# Patient Record
Sex: FEMALE | Race: WHITE | Employment: FULL TIME | URBAN - METROPOLITAN AREA
[De-identification: names, ages, dates, MRNs, and addresses within clinical notes are randomized per-mention and may not be internally consistent; named-entity substitution may affect disease eponyms.]

---

## 2017-02-23 ENCOUNTER — HOSPITAL ENCOUNTER (EMERGENCY)
Facility: HOSPITAL | Age: 27
Discharge: HOME | End: 2017-02-23
Attending: EMERGENCY MEDICINE
Payer: COMMERCIAL

## 2017-02-23 ENCOUNTER — APPOINTMENT (OUTPATIENT)
Dept: CT IMAGING | Facility: HOSPITAL | Age: 27
End: 2017-02-23
Attending: EMERGENCY MEDICINE
Payer: COMMERCIAL

## 2017-02-23 VITALS
BODY MASS INDEX: 23.9 KG/M2 | RESPIRATION RATE: 20 BRPM | TEMPERATURE: 98 F | DIASTOLIC BLOOD PRESSURE: 95 MMHG | HEART RATE: 111 BPM | WEIGHT: 140 LBS | OXYGEN SATURATION: 98 % | SYSTOLIC BLOOD PRESSURE: 130 MMHG | HEIGHT: 64 IN

## 2017-02-23 DIAGNOSIS — R10.9 RIGHT FLANK PAIN: Primary | ICD-10-CM

## 2017-02-23 LAB
ANION GAP SERPL CALC-SCNC: 8 MMOL/L (ref 0–18)
B-HCG UR QL: NEGATIVE
BASOPHILS # BLD: 0.1 K/UL (ref 0–0.2)
BASOPHILS NFR BLD: 1 %
BILIRUB UR QL: NEGATIVE
BUN SERPL-MCNC: 6 MG/DL (ref 8–20)
BUN/CREAT SERPL: 8.3 (ref 10–20)
CALCIUM SERPL-MCNC: 9.6 MG/DL (ref 8.5–10.5)
CHLORIDE SERPL-SCNC: 104 MMOL/L (ref 95–110)
CLARITY UR: CLEAR
CO2 SERPL-SCNC: 28 MMOL/L (ref 22–32)
COLOR UR: COLORLESS
CREAT SERPL-MCNC: 0.72 MG/DL (ref 0.5–1.5)
EOSINOPHIL # BLD: 0.3 K/UL (ref 0–0.7)
EOSINOPHIL NFR BLD: 3 %
ERYTHROCYTE [DISTWIDTH] IN BLOOD BY AUTOMATED COUNT: 14 % (ref 11–15)
GLUCOSE SERPL-MCNC: 91 MG/DL (ref 70–99)
GLUCOSE UR-MCNC: NEGATIVE MG/DL
HCT VFR BLD AUTO: 43.2 % (ref 35–48)
HGB BLD-MCNC: 14.4 G/DL (ref 12–16)
HGB UR QL STRIP.AUTO: NEGATIVE
KETONES UR-MCNC: NEGATIVE MG/DL
LEUKOCYTE ESTERASE UR QL STRIP.AUTO: NEGATIVE
LYMPHOCYTES # BLD: 2.7 K/UL (ref 1–4)
LYMPHOCYTES NFR BLD: 28 %
MCH RBC QN AUTO: 28 PG (ref 27–32)
MCHC RBC AUTO-ENTMCNC: 33.4 G/DL (ref 32–37)
MCV RBC AUTO: 83.9 FL (ref 80–100)
MONOCYTES # BLD: 0.7 K/UL (ref 0–1)
MONOCYTES NFR BLD: 8 %
NEUTROPHILS # BLD AUTO: 5.7 K/UL (ref 1.8–7.7)
NEUTROPHILS NFR BLD: 60 %
NITRITE UR QL STRIP.AUTO: NEGATIVE
OSMOLALITY UR CALC.SUM OF ELEC: 287 MOSM/KG (ref 275–295)
PH UR: 7 [PH] (ref 5–8)
PLATELET # BLD AUTO: 282 K/UL (ref 140–400)
PMV BLD AUTO: 8.6 FL (ref 7.4–10.3)
POTASSIUM SERPL-SCNC: 3.6 MMOL/L (ref 3.3–5.1)
PROT UR-MCNC: NEGATIVE MG/DL
RBC # BLD AUTO: 5.15 M/UL (ref 3.7–5.4)
SODIUM SERPL-SCNC: 140 MMOL/L (ref 136–144)
SP GR UR STRIP: <1.005 (ref 1–1.03)
UROBILINOGEN UR STRIP-ACNC: <2
VIT C UR-MCNC: NEGATIVE MG/DL
WBC # BLD AUTO: 9.4 K/UL (ref 4–11)

## 2017-02-23 PROCEDURE — 81025 URINE PREGNANCY TEST: CPT

## 2017-02-23 PROCEDURE — 80048 BASIC METABOLIC PNL TOTAL CA: CPT | Performed by: EMERGENCY MEDICINE

## 2017-02-23 PROCEDURE — 99284 EMERGENCY DEPT VISIT MOD MDM: CPT

## 2017-02-23 PROCEDURE — 96361 HYDRATE IV INFUSION ADD-ON: CPT

## 2017-02-23 PROCEDURE — 81001 URINALYSIS AUTO W/SCOPE: CPT | Performed by: EMERGENCY MEDICINE

## 2017-02-23 PROCEDURE — 96375 TX/PRO/DX INJ NEW DRUG ADDON: CPT

## 2017-02-23 PROCEDURE — 96374 THER/PROPH/DIAG INJ IV PUSH: CPT

## 2017-02-23 PROCEDURE — 85025 COMPLETE CBC W/AUTO DIFF WBC: CPT | Performed by: EMERGENCY MEDICINE

## 2017-02-23 RX ORDER — HYDROMORPHONE HYDROCHLORIDE 1 MG/ML
0.5 INJECTION, SOLUTION INTRAMUSCULAR; INTRAVENOUS; SUBCUTANEOUS ONCE
Status: COMPLETED | OUTPATIENT
Start: 2017-02-23 | End: 2017-02-23

## 2017-02-23 RX ORDER — KETOROLAC TROMETHAMINE 30 MG/ML
30 INJECTION, SOLUTION INTRAMUSCULAR; INTRAVENOUS ONCE
Status: COMPLETED | OUTPATIENT
Start: 2017-02-23 | End: 2017-02-23

## 2017-02-23 RX ORDER — ONDANSETRON 2 MG/ML
4 INJECTION INTRAMUSCULAR; INTRAVENOUS ONCE
Status: COMPLETED | OUTPATIENT
Start: 2017-02-23 | End: 2017-02-23

## 2017-02-23 NOTE — ED NOTES
Patient with 10/10 right flank pain. IV established to left AC, #20, labs sent. Fluids infusing, medicated as ordered. CT ready.

## 2017-02-23 NOTE — ED PROVIDER NOTES
Patient Seen in: [facility] AND [facility] Emergency Department    History   Patient presents with:  Abdomen/Flank Pain (GI/)    Stated Complaint: right side flank pain     HPI    51-year-old female with history of prior kidney stones 7 years ago presents with today and agreed except as otherwise stated in HPI.     Physical Exam       ED Triage Vitals   BP 02/23/17 0711 130/95 mmHg   Pulse 02/23/17 0711 111   Resp 02/23/17 0711 20   Temp 02/23/17 0711 97.8 °F (36.6 °C)   Temp src 02/23/17 0711 Temporal   SpO2 02/ Status                     ---------                               -----------         ------                     CBC W/ DIFFERENTIAL[[id number]]                              Final result                 Please view results for these tests on the individual

## 2017-02-23 NOTE — ED NOTES
Patient refused CT scan stating she already knows whats wrong with her. Dr. Franklin Crook made aware and discussed with patient. Patient still refusing.

## 2017-03-21 ENCOUNTER — TELEPHONE (OUTPATIENT)
Dept: OBGYN CLINIC | Facility: CLINIC | Age: 27
End: 2017-03-21

## 2017-03-21 RX ORDER — FLUCONAZOLE 150 MG/1
150 TABLET ORAL ONCE
Qty: 2 TABLET | Refills: 0 | Status: SHIPPED | OUTPATIENT
Start: 2017-03-21 | End: 2017-03-21

## 2017-03-21 RX ORDER — METRONIDAZOLE 7.5 MG/G
1 GEL VAGINAL NIGHTLY
Qty: 70 G | Refills: 0 | Status: SHIPPED | OUTPATIENT
Start: 2017-03-21 | End: 2017-03-26

## 2017-03-21 NOTE — TELEPHONE ENCOUNTER
Pt having moderate amount of thick, whitish discharge, slight odor. No itching. Pt has been treated multiple times for yeast infections in 8/30/16 and 9/6/16.  She was ordered Metrogel and Diflucin at her 9/6/16 appointment, and pt voices treatment worked \

## 2017-03-21 NOTE — TELEPHONE ENCOUNTER
Dr. Sharmin Mayfield is not in the office, has not been since this morning. Please address this urgent issue the doctor on call.

## 2017-07-10 ENCOUNTER — OFFICE VISIT (OUTPATIENT)
Dept: OBGYN CLINIC | Facility: CLINIC | Age: 27
End: 2017-07-10

## 2017-07-10 VITALS
HEART RATE: 89 BPM | HEIGHT: 65 IN | WEIGHT: 149 LBS | SYSTOLIC BLOOD PRESSURE: 128 MMHG | DIASTOLIC BLOOD PRESSURE: 87 MMHG | BODY MASS INDEX: 24.83 KG/M2

## 2017-07-10 DIAGNOSIS — Z01.419 WELL WOMAN EXAM WITH ROUTINE GYNECOLOGICAL EXAM: Primary | ICD-10-CM

## 2017-07-10 DIAGNOSIS — N89.8 VAGINAL DISCHARGE: ICD-10-CM

## 2017-07-10 DIAGNOSIS — Z11.3 ROUTINE SCREENING FOR STI (SEXUALLY TRANSMITTED INFECTION): ICD-10-CM

## 2017-07-10 PROCEDURE — 99395 PREV VISIT EST AGE 18-39: CPT | Performed by: ADVANCED PRACTICE MIDWIFE

## 2017-07-10 NOTE — PROGRESS NOTES
HPI:    Patient ID: Vale Jones is a 32year old female. Has had recent frequent vaginal infection. Is in a new relationship. Not using any contraception. MMR.   Had pneumonia in December and was treated with ABX    Menses heavy in beginning and Negative. Skin: Negative. Neurological: Negative. Current Outpatient Prescriptions:  guaiFENesin-codeine (CHERATUSSIN AC) 100-10 MG/5ML Oral Solution Take 5 mL by mouth every 6 (six) hours as needed for cough.  Disp: 180 mL Rfl: 0   Albu Right adnexum displays no mass, no tenderness and no fullness. Left adnexum displays no mass, no tenderness and no fullness. No vaginal discharge found. Musculoskeletal: Normal range of motion. She exhibits no edema or tenderness.    Lymphadenopathy:

## 2017-07-11 ENCOUNTER — TELEPHONE (OUTPATIENT)
Dept: OBGYN CLINIC | Facility: CLINIC | Age: 27
End: 2017-07-11

## 2017-07-11 DIAGNOSIS — B96.89 BV (BACTERIAL VAGINOSIS): Primary | ICD-10-CM

## 2017-07-11 DIAGNOSIS — N76.0 BV (BACTERIAL VAGINOSIS): Primary | ICD-10-CM

## 2017-07-11 LAB
C TRACH DNA SPEC QL NAA+PROBE: NEGATIVE
N GONORRHOEA DNA SPEC QL NAA+PROBE: NEGATIVE

## 2017-07-11 RX ORDER — METRONIDAZOLE 500 MG/1
500 TABLET ORAL 2 TIMES DAILY
Qty: 14 TABLET | Refills: 0 | Status: SHIPPED | OUTPATIENT
Start: 2017-07-11 | End: 2017-07-18

## 2017-07-11 NOTE — TELEPHONE ENCOUNTER
----- Message from SRINIVAS Castillo sent at 7/11/2017 10:04 AM CDT -----  Has a BV infection needs Flagyl 500 mg BID x 7 days no alcohol for 10 days

## 2017-07-12 LAB
CANDIDA SCREEN: NEGATIVE
GENITAL VAGINOSIS SCREEN: POSITIVE
HPV I/H RISK 1 DNA SPEC QL NAA+PROBE: NEGATIVE
TRICHOMONAS SCREEN: NEGATIVE

## 2018-06-30 ENCOUNTER — APPOINTMENT (OUTPATIENT)
Dept: OCCUPATIONAL MEDICINE | Age: 28
End: 2018-06-30
Attending: EMERGENCY MEDICINE

## 2018-08-21 ENCOUNTER — OFFICE VISIT (OUTPATIENT)
Dept: FAMILY MEDICINE CLINIC | Facility: CLINIC | Age: 28
End: 2018-08-21

## 2018-08-21 VITALS
TEMPERATURE: 98 F | SYSTOLIC BLOOD PRESSURE: 124 MMHG | WEIGHT: 158 LBS | BODY MASS INDEX: 26.98 KG/M2 | HEART RATE: 74 BPM | HEIGHT: 64 IN | RESPIRATION RATE: 14 BRPM | DIASTOLIC BLOOD PRESSURE: 80 MMHG | OXYGEN SATURATION: 99 %

## 2018-08-21 DIAGNOSIS — J06.9 VIRAL URI WITH COUGH: Primary | ICD-10-CM

## 2018-08-21 DIAGNOSIS — F17.200 CURRENT SMOKER: ICD-10-CM

## 2018-08-21 PROCEDURE — 99202 OFFICE O/P NEW SF 15 MIN: CPT | Performed by: NURSE PRACTITIONER

## 2018-08-21 RX ORDER — BROMPHENIRAMINE MALEATE, PSEUDOEPHEDRINE HYDROCHLORIDE, AND DEXTROMETHORPHAN HYDROBROMIDE 2; 30; 10 MG/5ML; MG/5ML; MG/5ML
10 SYRUP ORAL 4 TIMES DAILY PRN
Qty: 120 ML | Refills: 0 | Status: SHIPPED | OUTPATIENT
Start: 2018-08-21 | End: 2019-05-15

## 2018-08-21 NOTE — PROGRESS NOTES
CHIEF COMPLAINT:   Patient presents with:  Nasal Congestion  Cough      HPI:   Maria Ines Keith is a 29year old female who presents for uri symptoms for  2 days. Patient reports sore throat only at the beginning of sx's, congestion, dry cough.  Symptoms ha /80 (BP Location: Right arm, Patient Position: Sitting, Cuff Size: adult)   Pulse 74   Temp 98 °F (36.7 °C) (Oral)   Resp 14   Ht 64\"   Wt 158 lb   LMP 08/16/2018 (Exact Date)   SpO2 99%   BMI 27.12 kg/m²   GENERAL: well developed, well nourished,in · Hand washing-use hand  or wash hands frequently, cover your cough or sneeze, do not share towels or drinks with others. · Salt water gargles (1 tsp. Salt in 6 oz lukewarm water), use several times daily to help decrease swelling and pain.   · Us · You may use acetaminophen or ibuprofen to control pain and fever, unless another medicine was prescribed. If you have chronic liver or kidney disease, have ever had a stomach ulcer or gastrointestinal bleeding, or are taking blood-thinning medicines, makenzie

## 2018-08-21 NOTE — PATIENT INSTRUCTIONS
· Hydrate! (cold or hot based on comfort). Drink lots of water or other non dehydrating liquids to help with illness. Salty foods, soups and tea can help with throat pain.    · Hand washing-use hand  or wash hands frequently, cover your cough o (yours or others’).   · You may use acetaminophen or ibuprofen to control pain and fever, unless another medicine was prescribed. If you have chronic liver or kidney disease, have ever had a stomach ulcer or gastrointestinal bleeding, or are taking blood-th

## 2019-03-25 ENCOUNTER — OFFICE VISIT (OUTPATIENT)
Dept: INTERNAL MEDICINE CLINIC | Facility: CLINIC | Age: 29
End: 2019-03-25
Payer: COMMERCIAL

## 2019-03-25 VITALS
HEIGHT: 64 IN | DIASTOLIC BLOOD PRESSURE: 70 MMHG | WEIGHT: 158 LBS | TEMPERATURE: 98 F | HEART RATE: 80 BPM | BODY MASS INDEX: 26.98 KG/M2 | SYSTOLIC BLOOD PRESSURE: 118 MMHG

## 2019-03-25 DIAGNOSIS — Z00.00 ANNUAL PHYSICAL EXAM: Primary | ICD-10-CM

## 2019-03-25 DIAGNOSIS — K62.5 ANAL BLEEDING: ICD-10-CM

## 2019-03-25 DIAGNOSIS — F32.A ANXIETY AND DEPRESSION: ICD-10-CM

## 2019-03-25 DIAGNOSIS — R35.0 URINARY FREQUENCY: ICD-10-CM

## 2019-03-25 DIAGNOSIS — F41.9 ANXIETY AND DEPRESSION: ICD-10-CM

## 2019-03-25 DIAGNOSIS — L70.0 ACNE VULGARIS: ICD-10-CM

## 2019-03-25 PROBLEM — N89.8 VAGINAL DISCHARGE: Status: RESOLVED | Noted: 2017-07-10 | Resolved: 2019-03-25

## 2019-03-25 LAB
APPEARANCE: CLEAR
BILIRUBIN: NEGATIVE
GLUCOSE (URINE DIPSTICK): NEGATIVE MG/DL
KETONES (URINE DIPSTICK): 15 MG/DL
LEUKOCYTES: NEGATIVE
MULTISTIX LOT#: NORMAL NUMERIC
NITRITE, URINE: NEGATIVE
OCCULT BLOOD: NEGATIVE
PH, URINE: 7 (ref 4.5–8)
PROTEIN (URINE DIPSTICK): NEGATIVE MG/DL
SPECIFIC GRAVITY: 1.01 (ref 1–1.03)
URINE-COLOR: YELLOW
UROBILINOGEN,SEMI-QN: NEGATIVE MG/DL (ref 0–1.9)

## 2019-03-25 PROCEDURE — 99385 PREV VISIT NEW AGE 18-39: CPT | Performed by: INTERNAL MEDICINE

## 2019-03-25 PROCEDURE — 81003 URINALYSIS AUTO W/O SCOPE: CPT | Performed by: INTERNAL MEDICINE

## 2019-03-25 NOTE — PROGRESS NOTES
HPI:    Patient ID: Fatou Figueroa is a 34year old female. Patient presents today for her annual physical and also to get reestalbished with us as her PCP. Urinary Frequency    This is a new problem.  The current episode started in the past 3 [address] Nose: Nose normal.   Mouth/Throat: Oropharynx is clear and moist. No oropharyngeal exudate. Eyes: Conjunctivae and EOM are normal. Pupils are equal, round, and reactive to light. Right eye exhibits no discharge. Left eye exhibits no discharge.    Neck: Automated Dip without microscopy (PCA and [facility] ONLY) [99880]      CBC W Differential W Platelet [E]      Comp Metabolic Panel (14) [E]      Lipid Panel [E]      TSH [E]      Meds This Visit:  Requested Prescriptions      No prescriptions requested or order

## 2019-04-03 ENCOUNTER — LAB ENCOUNTER (OUTPATIENT)
Dept: LAB | Age: 29
End: 2019-04-03
Attending: INTERNAL MEDICINE
Payer: COMMERCIAL

## 2019-04-03 DIAGNOSIS — Z00.00 ANNUAL PHYSICAL EXAM: ICD-10-CM

## 2019-04-03 PROCEDURE — 36415 COLL VENOUS BLD VENIPUNCTURE: CPT

## 2019-04-03 PROCEDURE — 80053 COMPREHEN METABOLIC PANEL: CPT

## 2019-04-03 PROCEDURE — 80061 LIPID PANEL: CPT

## 2019-04-03 PROCEDURE — 85025 COMPLETE CBC W/AUTO DIFF WBC: CPT

## 2019-04-03 PROCEDURE — 84443 ASSAY THYROID STIM HORMONE: CPT

## 2019-04-04 ENCOUNTER — TELEPHONE (OUTPATIENT)
Dept: OTHER | Age: 29
End: 2019-04-04

## 2019-04-04 DIAGNOSIS — R30.0 DYSURIA: Primary | ICD-10-CM

## 2019-04-04 DIAGNOSIS — Z32.00 ENCOUNTER FOR PREGNANCY TEST, RESULT UNKNOWN: ICD-10-CM

## 2019-04-04 NOTE — TELEPHONE ENCOUNTER
Generated pregnancy test order and pt informed. Pt plans to complete ua& c/s along with pregnancy test tomorrow.

## 2019-04-04 NOTE — TELEPHONE ENCOUNTER
Reviewed Dr Marx Mention orders with pt who verbalized understanding. Pt has taken AZO 2 tablets on Sat 30th and Wed 3rd 2019. Dr Corie Palma,    Pt requesting to have a pregnancy test. Can it be added to last urine collection if possible?   Please reply

## 2019-04-04 NOTE — TELEPHONE ENCOUNTER
Pt states she discussed feeling of urgency to urinate at OV at 3/25/19 OV. Urine test done in office at that time.  Pt states symptoms returned on 3/30/19, took OTC AZO and symptoms resolved until last evening, pt took only two pills on Saturday and again l

## 2019-04-05 NOTE — TELEPHONE ENCOUNTER
Dr Na Coyle, please note. Patient got a message to call. She is aware of need to get UA/C&S and pregnancy test, she will go today at lunch break. She asked about lab results from 4/3/19, will await those results with today's results.

## 2019-04-06 ENCOUNTER — LAB ENCOUNTER (OUTPATIENT)
Dept: LAB | Age: 29
End: 2019-04-06
Attending: INTERNAL MEDICINE
Payer: COMMERCIAL

## 2019-04-06 ENCOUNTER — OFFICE VISIT (OUTPATIENT)
Dept: INTERNAL MEDICINE CLINIC | Facility: CLINIC | Age: 29
End: 2019-04-06
Payer: COMMERCIAL

## 2019-04-06 VITALS
BODY MASS INDEX: 25.16 KG/M2 | HEART RATE: 84 BPM | DIASTOLIC BLOOD PRESSURE: 78 MMHG | WEIGHT: 151 LBS | HEIGHT: 65 IN | TEMPERATURE: 98 F | SYSTOLIC BLOOD PRESSURE: 112 MMHG

## 2019-04-06 DIAGNOSIS — J02.9 PHARYNGITIS, UNSPECIFIED ETIOLOGY: ICD-10-CM

## 2019-04-06 DIAGNOSIS — R09.82 POST-NASAL DRIP: ICD-10-CM

## 2019-04-06 DIAGNOSIS — J40 BRONCHITIS: Primary | ICD-10-CM

## 2019-04-06 DIAGNOSIS — R30.0 DYSURIA: ICD-10-CM

## 2019-04-06 PROCEDURE — 81001 URINALYSIS AUTO W/SCOPE: CPT

## 2019-04-06 PROCEDURE — 99212 OFFICE O/P EST SF 10 MIN: CPT | Performed by: INTERNAL MEDICINE

## 2019-04-06 RX ORDER — ALBUTEROL SULFATE 90 UG/1
2 AEROSOL, METERED RESPIRATORY (INHALATION) EVERY 4 HOURS PRN
Qty: 1 INHALER | Refills: 6 | Status: SHIPPED | OUTPATIENT
Start: 2019-04-06 | End: 2019-05-15

## 2019-04-06 NOTE — PROGRESS NOTES
HPI    Patient ID: Jennyfer Barnhart is a 34year old female. Chief Complaint: Sore Throat (started yesterday, cough yellow mucus)      PMH: allergic rhinitis, used to smoke 1ppd x 10y and now only socially but doesn't buy cigarettes, no asthma.  [place]/ Patient Active Problem List:     Acne     Annual physical exam     Urinary frequency     Anal bleeding     Anxiety and depression      Social History    reports that she has been smoking cigarettes.   She has never used smokeless tobacco. She reports that s Abdominal: Soft. Bowel sounds are normal. There is no tenderness. Lymphadenopathy:        Head (right side): No submental, no submandibular, no preauricular, no posterior auricular and no occipital adenopathy present.         Head (left side): No submenta Medications This Visit:  Requested Prescriptions     Signed Prescriptions Disp Refills   • Albuterol Sulfate HFA (PROAIR HFA) 108 (90 Base) MCG/ACT Inhalation Aero Soln 1 Inhaler 6     Sig: Inhale 2 puffs into the lungs every 4 (four) hours as needed for W · You may use over-the-counter medicine to control fever or pain, unless another pain medicine was prescribed. If you have chronic liver or kidney disease or have ever had a stomach ulcer or gastrointestinal bleeding, talk with your healthcare provider bef © 5881-1684 [employer] [address] [facility], [facility] [place]. All rights reserved. This information is not intended as a substitute for professional medical care. Always follow your healthcare professional's instructions.

## 2019-04-08 NOTE — PROGRESS NOTES
Spoke with patient (identified name and ), results reviewed and agrees with plan.   Dr Ella Amaya [phone]

## 2019-04-22 ENCOUNTER — HOSPITAL ENCOUNTER (EMERGENCY)
Facility: HOSPITAL | Age: 29
Discharge: HOME | End: 2019-04-22
Attending: EMERGENCY MEDICINE
Payer: COMMERCIAL

## 2019-04-22 ENCOUNTER — APPOINTMENT (OUTPATIENT)
Dept: CT IMAGING | Facility: HOSPITAL | Age: 29
End: 2019-04-22
Attending: EMERGENCY MEDICINE
Payer: COMMERCIAL

## 2019-04-22 VITALS
HEART RATE: 68 BPM | WEIGHT: 150 LBS | DIASTOLIC BLOOD PRESSURE: 83 MMHG | HEIGHT: 65 IN | BODY MASS INDEX: 24.99 KG/M2 | TEMPERATURE: 99 F | SYSTOLIC BLOOD PRESSURE: 136 MMHG | RESPIRATION RATE: 18 BRPM | OXYGEN SATURATION: 98 %

## 2019-04-22 DIAGNOSIS — R30.0 DYSURIA: Primary | ICD-10-CM

## 2019-04-22 PROCEDURE — 74176 CT ABD & PELVIS W/O CONTRAST: CPT | Performed by: EMERGENCY MEDICINE

## 2019-04-22 PROCEDURE — 80048 BASIC METABOLIC PNL TOTAL CA: CPT | Performed by: EMERGENCY MEDICINE

## 2019-04-22 PROCEDURE — 81025 URINE PREGNANCY TEST: CPT

## 2019-04-22 PROCEDURE — 85025 COMPLETE CBC W/AUTO DIFF WBC: CPT | Performed by: EMERGENCY MEDICINE

## 2019-04-22 PROCEDURE — 36415 COLL VENOUS BLD VENIPUNCTURE: CPT

## 2019-04-22 PROCEDURE — 99284 EMERGENCY DEPT VISIT MOD MDM: CPT

## 2019-04-22 NOTE — ED INITIAL ASSESSMENT (HPI)
Pt c/o UTI like symptoms for the past two weeks, had a negative urine test at [facility] a week ago. Pt states having urgency, frequency, dysuria yesterday.

## 2019-04-23 NOTE — ED PROVIDER NOTES
Patient Seen in: [facility] AND [facility] Emergency Department    History   Patient presents with:  Urinary Symptoms (urologic)    Stated Complaint: abdominal pain    HPI    34year old female otherwise healthy who presents with 2-3 weeks of intermittent dysuri is cooperative. Non-toxic appearance. No distress. HENT:   Head: Normocephalic and atraumatic. Eyes: Pupils are equal, round, and reactive to light.  Conjunctivae and EOM are normal.   Neck: Normal range of motion and full passive range of motion witho RAINBOW DRAW LAVENDER   RAINBOW DRAW LIGHT GREEN   RAINBOW DRAW GOLD   CBC W/ DIFFERENTIAL         MDM       Pulse Ox: 98%, Normal, RA    Radiology findings:     Ct Abdomen+pelvis Kidneystone 2d Rndr(no Iv,no Oral)(cpt=74176)    Result Date: 4/22/2019  C

## 2019-04-29 ENCOUNTER — OFFICE VISIT (OUTPATIENT)
Dept: OBGYN CLINIC | Facility: CLINIC | Age: 29
End: 2019-04-29
Payer: COMMERCIAL

## 2019-04-29 VITALS
SYSTOLIC BLOOD PRESSURE: 122 MMHG | WEIGHT: 153 LBS | HEIGHT: 65 IN | BODY MASS INDEX: 25.49 KG/M2 | DIASTOLIC BLOOD PRESSURE: 76 MMHG

## 2019-04-29 DIAGNOSIS — F41.9 ANXIETY: ICD-10-CM

## 2019-04-29 DIAGNOSIS — N36.8 URETHRAL IRRITATION: ICD-10-CM

## 2019-04-29 DIAGNOSIS — L70.0 ACNE VULGARIS: ICD-10-CM

## 2019-04-29 DIAGNOSIS — Z11.3 ROUTINE SCREENING FOR STI (SEXUALLY TRANSMITTED INFECTION): ICD-10-CM

## 2019-04-29 DIAGNOSIS — Z01.419 ENCOUNTER FOR ANNUAL ROUTINE GYNECOLOGICAL EXAMINATION: Primary | ICD-10-CM

## 2019-04-29 PROCEDURE — 99395 PREV VISIT EST AGE 18-39: CPT | Performed by: ADVANCED PRACTICE MIDWIFE

## 2019-04-30 ENCOUNTER — TELEPHONE (OUTPATIENT)
Dept: OBGYN CLINIC | Facility: CLINIC | Age: 29
End: 2019-04-30

## 2019-04-30 RX ORDER — METRONIDAZOLE 500 MG/1
500 TABLET ORAL 2 TIMES DAILY
Qty: 14 TABLET | Refills: 0 | Status: SHIPPED | OUTPATIENT
Start: 2019-04-30 | End: 2019-05-07

## 2019-04-30 NOTE — TELEPHONE ENCOUNTER
----- Message from EUSEBIO Sofia sent at 4/30/2019 11:00 AM CDT -----  Has a BV infection needs Flagyl 500 mg BID x 7 days no alcohol for 10 days

## 2019-04-30 NOTE — PROGRESS NOTES
HPI:    Patient ID: Jessa Zarate is a 34year old female. [facility] reviewed and updated    Pt here for annual exam but also says she feels like her hormones are out of balance. She has bouts of anxiety and agitation off and on through out the month. Lab Results   Component Value Date    New Radha Cervix/Endocervical 02/26/2016     Lab Results   Component Value Date    HPV1 Negative 02/26/2016         Current Outpatient Medications:  Phenazopyridine HCl (AZO TABS OR) Take by mouth.  Disp:  Rfl:    Alb PHYSICAL EXAM:   Physical Exam   Constitutional: She is oriented to person, place, and time. She appears well-developed and well-nourished. No distress. HENT:   Head: Normocephalic and atraumatic.    Right Ear: External ear normal.   Left Ear: External Nursing note and vitals reviewed.              ASSESSMENT/PLAN:   Routine screening for sti (sexually transmitted infection)  (primary encounter diagnosis)  Anxiety  Acne vulgaris  Encounter for annual routine gynecological examination  Urethral irritatio

## 2019-05-01 ENCOUNTER — TELEPHONE (OUTPATIENT)
Dept: OBGYN CLINIC | Facility: CLINIC | Age: 29
End: 2019-05-01

## 2019-05-01 NOTE — TELEPHONE ENCOUNTER
Informed pt of message below.  Pt voices understanding.    ----- Message from EUSEBIO Kennedy sent at 4/30/2019 12:56 PM CDT -----  Negative Chlamydia/GC please notify patient

## 2019-05-02 RX ORDER — METRONIDAZOLE 7.5 MG/G
1 GEL VAGINAL NIGHTLY
Qty: 1 TUBE | Refills: 0 | Status: SHIPPED | OUTPATIENT
Start: 2019-05-02 | End: 2019-05-07

## 2019-05-02 NOTE — TELEPHONE ENCOUNTER
RN returned call to patient. Per patient has not yet begun her Flagyl Rx as prescribed as she will be out of town this week and would like to consume alcohol. Pt requesting metrogel instead of Flagyl.  Per patient has noted continued symptoms of bladder pr

## 2019-05-02 NOTE — TELEPHONE ENCOUNTER
Pt hasn't started antibiotic because she went on a trip and wanted to be able to drink alcohol, requesting to speak with nurse, states symptoms have gotten worse.

## 2019-05-06 ENCOUNTER — OFFICE VISIT (OUTPATIENT)
Dept: SURGERY | Facility: CLINIC | Age: 29
End: 2019-05-06
Payer: COMMERCIAL

## 2019-05-06 VITALS
SYSTOLIC BLOOD PRESSURE: 159 MMHG | HEART RATE: 73 BPM | DIASTOLIC BLOOD PRESSURE: 96 MMHG | WEIGHT: 150 LBS | HEIGHT: 65 IN | TEMPERATURE: 98 F | RESPIRATION RATE: 16 BRPM | BODY MASS INDEX: 24.99 KG/M2

## 2019-05-06 DIAGNOSIS — Z87.891 FORMER SMOKER: ICD-10-CM

## 2019-05-06 DIAGNOSIS — R31.29 MICROHEMATURIA: Primary | ICD-10-CM

## 2019-05-06 DIAGNOSIS — R10.2 FEMALE PELVIC PAIN: ICD-10-CM

## 2019-05-06 DIAGNOSIS — N76.0 BACTERIAL VAGINOSIS: ICD-10-CM

## 2019-05-06 DIAGNOSIS — Z87.442 HISTORY OF KIDNEY STONES: ICD-10-CM

## 2019-05-06 DIAGNOSIS — B96.89 BACTERIAL VAGINOSIS: ICD-10-CM

## 2019-05-06 PROCEDURE — 99212 OFFICE O/P EST SF 10 MIN: CPT | Performed by: UROLOGY

## 2019-05-06 PROCEDURE — 99244 OFF/OP CNSLTJ NEW/EST MOD 40: CPT | Performed by: UROLOGY

## 2019-05-06 NOTE — PATIENT INSTRUCTIONS
Alex Bashir M.D.    1.   Please return to see your gynecologist Dr. Layla Soliz  for vaginal bleeding, since vaginal bleeding is not from a urinary tract cause    2.   You must take the metronidazole gel prescribed by her

## 2019-05-06 NOTE — PROGRESS NOTES
Wandy Poritllo is a 34year old female. Reason for Consultation:   Patient presents with:  UTI: pressure, discomfort with uriantion, going on since March 2019, OTC AZO and advil has helped but the pressure is back after 3-4 days.  pt went to ER, [facility]; suprapubic pressure will come for few days at a time; dysuria, referred to Dr. Faviola Tony. Urological History--First time seeing urologist; History of kidney stones--all passed spontaneously;  Patient has been pregnant 3 times with 2 miscarriages a at bedtime.  Causes drowsiness. ).  Disp: 120 mL Rfl: 0       Allergies:  No Known Allergies      ROS:   Genitourinary:  Negative for dysuria and hematuria; Positive for vaginal discharge, vaginal discharge, dysuria, urinary frequency, urinary incontinence, organomegaly noted, no masses  Genitourinary:   Flanks  Non-tender  Bladder normal.  Normal secondary sexual characteristics and normal pubic hair distribution. Labia majora and labia minora unremarkable.   Vaginal introitus  Unremarkable  Meatus of the ure 4/22/2019 CT abdomen and pelvis without contrast showed no tumors or stones. Patient is at increased risk for having tumors of the urinary tract and these need to be excluded.  Therefore I recommend urine cytology followed by cystoscopy with bilateral retro her vagina but not expecting her menses. Patient was prescribed metronidazole gel by her other doctors to treat this but she has not started the medication yet.  I explained to patient that she needs to complete her prescription before we can evaluate her a urological  developments.     Dr. Mariah Novoa M.D., FACS               Orders This Visit:  Orders Placed This Encounter      Cytology, fluids -- specimen      Urine Culture - SPECIMEN      Meds This Visit:  Requested Prescriptions      No prescrip

## 2019-05-07 ENCOUNTER — TELEPHONE (OUTPATIENT)
Dept: SURGERY | Facility: CLINIC | Age: 29
End: 2019-05-07

## 2019-05-09 ENCOUNTER — TELEPHONE (OUTPATIENT)
Dept: SURGERY | Facility: CLINIC | Age: 29
End: 2019-05-09

## 2019-05-09 DIAGNOSIS — R31.29 MICROHEMATURIA: Primary | ICD-10-CM

## 2019-05-09 NOTE — TELEPHONE ENCOUNTER
Spoke to patient scheduled for CYSTOSCOPY WITH BILATERAL RETROGRADE PYELOGRAM X-RAYS, POSSIBLE BIOPSY OF KIDNEY PELVIS, URETERS OR BLADDER, Tuesday, May 21 at 7:30 [address] outpatient, went over preop instructions with patient will mail out pat

## 2019-05-21 ENCOUNTER — HOSPITAL ENCOUNTER (OUTPATIENT)
Facility: HOSPITAL | Age: 29
Setting detail: HOSPITAL OUTPATIENT SURGERY
Discharge: HOME | End: 2019-05-21
Attending: UROLOGY | Admitting: UROLOGY
Payer: COMMERCIAL

## 2019-05-21 ENCOUNTER — TELEPHONE (OUTPATIENT)
Dept: SURGERY | Facility: CLINIC | Age: 29
End: 2019-05-21

## 2019-05-21 ENCOUNTER — ANESTHESIA (OUTPATIENT)
Dept: SURGERY | Facility: HOSPITAL | Age: 29
End: 2019-05-21
Payer: COMMERCIAL

## 2019-05-21 ENCOUNTER — ANESTHESIA EVENT (OUTPATIENT)
Dept: SURGERY | Facility: HOSPITAL | Age: 29
End: 2019-05-21
Payer: COMMERCIAL

## 2019-05-21 ENCOUNTER — APPOINTMENT (OUTPATIENT)
Dept: GENERAL RADIOLOGY | Facility: HOSPITAL | Age: 29
End: 2019-05-21
Attending: UROLOGY
Payer: COMMERCIAL

## 2019-05-21 VITALS
HEIGHT: 65 IN | DIASTOLIC BLOOD PRESSURE: 92 MMHG | RESPIRATION RATE: 17 BRPM | OXYGEN SATURATION: 99 % | SYSTOLIC BLOOD PRESSURE: 136 MMHG | WEIGHT: 146.88 LBS | TEMPERATURE: 98 F | HEART RATE: 74 BPM | BODY MASS INDEX: 24.47 KG/M2

## 2019-05-21 DIAGNOSIS — R31.29 MICROHEMATURIA: ICD-10-CM

## 2019-05-21 PROCEDURE — BT141ZZ FLUOROSCOPY OF KIDNEYS, URETERS AND BLADDER USING LOW OSMOLAR CONTRAST: ICD-10-PCS | Performed by: UROLOGY

## 2019-05-21 PROCEDURE — 74420 UROGRAPHY RTRGR +-KUB: CPT | Performed by: UROLOGY

## 2019-05-21 RX ORDER — HYDROCODONE BITARTRATE AND ACETAMINOPHEN 5; 325 MG/1; MG/1
1 TABLET ORAL AS NEEDED
Status: COMPLETED | OUTPATIENT
Start: 2019-05-21 | End: 2019-05-21

## 2019-05-21 RX ORDER — HYDROMORPHONE HYDROCHLORIDE 1 MG/ML
0.2 INJECTION, SOLUTION INTRAMUSCULAR; INTRAVENOUS; SUBCUTANEOUS EVERY 5 MIN PRN
Status: DISCONTINUED | OUTPATIENT
Start: 2019-05-21 | End: 2019-05-21

## 2019-05-21 RX ORDER — FAMOTIDINE 20 MG/1
20 TABLET ORAL ONCE
Status: DISCONTINUED | OUTPATIENT
Start: 2019-05-21 | End: 2019-05-21 | Stop reason: CLARIF

## 2019-05-21 RX ORDER — SODIUM CHLORIDE, SODIUM LACTATE, POTASSIUM CHLORIDE, CALCIUM CHLORIDE 600; 310; 30; 20 MG/100ML; MG/100ML; MG/100ML; MG/100ML
INJECTION, SOLUTION INTRAVENOUS CONTINUOUS PRN
Status: DISCONTINUED | OUTPATIENT
Start: 2019-05-21 | End: 2019-05-21 | Stop reason: SURG

## 2019-05-21 RX ORDER — HYDROCODONE BITARTRATE AND ACETAMINOPHEN 5; 325 MG/1; MG/1
2 TABLET ORAL AS NEEDED
Status: COMPLETED | OUTPATIENT
Start: 2019-05-21 | End: 2019-05-21

## 2019-05-21 RX ORDER — ONDANSETRON 2 MG/ML
4 INJECTION INTRAMUSCULAR; INTRAVENOUS ONCE AS NEEDED
Status: DISCONTINUED | OUTPATIENT
Start: 2019-05-21 | End: 2019-05-21

## 2019-05-21 RX ORDER — MORPHINE SULFATE 2 MG/ML
2 INJECTION, SOLUTION INTRAMUSCULAR; INTRAVENOUS EVERY 10 MIN PRN
Status: DISCONTINUED | OUTPATIENT
Start: 2019-05-21 | End: 2019-05-21

## 2019-05-21 RX ORDER — SODIUM CHLORIDE 9 MG/ML
INJECTION, SOLUTION INTRAVENOUS CONTINUOUS
Status: DISCONTINUED | OUTPATIENT
Start: 2019-05-21 | End: 2019-05-21

## 2019-05-21 RX ORDER — LIDOCAINE HYDROCHLORIDE 20 MG/ML
JELLY TOPICAL AS NEEDED
Status: DISCONTINUED | OUTPATIENT
Start: 2019-05-21 | End: 2019-05-21 | Stop reason: HOSPADM

## 2019-05-21 RX ORDER — METOCLOPRAMIDE 10 MG/1
10 TABLET ORAL ONCE
Status: DISCONTINUED | OUTPATIENT
Start: 2019-05-21 | End: 2019-05-21 | Stop reason: CLARIF

## 2019-05-21 RX ORDER — NALOXONE HYDROCHLORIDE 0.4 MG/ML
80 INJECTION, SOLUTION INTRAMUSCULAR; INTRAVENOUS; SUBCUTANEOUS AS NEEDED
Status: DISCONTINUED | OUTPATIENT
Start: 2019-05-21 | End: 2019-05-21

## 2019-05-21 RX ORDER — PHENAZOPYRIDINE HYDROCHLORIDE 200 MG/1
200 TABLET, FILM COATED ORAL 3 TIMES DAILY PRN
Qty: 15 TABLET | Refills: 1 | Status: SHIPPED | OUTPATIENT
Start: 2019-05-21 | End: 2019-09-23

## 2019-05-21 RX ORDER — HYDROMORPHONE HYDROCHLORIDE 1 MG/ML
0.6 INJECTION, SOLUTION INTRAMUSCULAR; INTRAVENOUS; SUBCUTANEOUS EVERY 5 MIN PRN
Status: DISCONTINUED | OUTPATIENT
Start: 2019-05-21 | End: 2019-05-21

## 2019-05-21 RX ORDER — PROCHLORPERAZINE EDISYLATE 5 MG/ML
5 INJECTION INTRAMUSCULAR; INTRAVENOUS ONCE AS NEEDED
Status: DISCONTINUED | OUTPATIENT
Start: 2019-05-21 | End: 2019-05-21

## 2019-05-21 RX ORDER — HYDROMORPHONE HYDROCHLORIDE 1 MG/ML
0.4 INJECTION, SOLUTION INTRAMUSCULAR; INTRAVENOUS; SUBCUTANEOUS EVERY 5 MIN PRN
Status: DISCONTINUED | OUTPATIENT
Start: 2019-05-21 | End: 2019-05-21

## 2019-05-21 RX ORDER — HYDROCODONE BITARTRATE AND ACETAMINOPHEN 5; 325 MG/1; MG/1
2 TABLET ORAL EVERY 4 HOURS PRN
Status: DISCONTINUED | OUTPATIENT
Start: 2019-05-21 | End: 2019-05-21

## 2019-05-21 RX ORDER — PHENAZOPYRIDINE HYDROCHLORIDE 200 MG/1
200 TABLET, FILM COATED ORAL ONCE AS NEEDED
Status: COMPLETED | OUTPATIENT
Start: 2019-05-21 | End: 2019-05-21

## 2019-05-21 RX ORDER — LIDOCAINE HYDROCHLORIDE 10 MG/ML
INJECTION, SOLUTION EPIDURAL; INFILTRATION; INTRACAUDAL; PERINEURAL AS NEEDED
Status: DISCONTINUED | OUTPATIENT
Start: 2019-05-21 | End: 2019-05-21 | Stop reason: SURG

## 2019-05-21 RX ORDER — ONDANSETRON 2 MG/ML
INJECTION INTRAMUSCULAR; INTRAVENOUS AS NEEDED
Status: DISCONTINUED | OUTPATIENT
Start: 2019-05-21 | End: 2019-05-21 | Stop reason: SURG

## 2019-05-21 RX ORDER — GENTAMICIN SULFATE 40 MG/ML
INJECTION, SOLUTION INTRAMUSCULAR; INTRAVENOUS AS NEEDED
Status: DISCONTINUED | OUTPATIENT
Start: 2019-05-21 | End: 2019-05-21 | Stop reason: HOSPADM

## 2019-05-21 RX ORDER — ACETAMINOPHEN 325 MG/1
650 TABLET ORAL EVERY 4 HOURS PRN
Status: DISCONTINUED | OUTPATIENT
Start: 2019-05-21 | End: 2019-05-21

## 2019-05-21 RX ORDER — MIDAZOLAM HYDROCHLORIDE 1 MG/ML
INJECTION INTRAMUSCULAR; INTRAVENOUS AS NEEDED
Status: DISCONTINUED | OUTPATIENT
Start: 2019-05-21 | End: 2019-05-21 | Stop reason: SURG

## 2019-05-21 RX ORDER — HYDROCODONE BITARTRATE AND ACETAMINOPHEN 5; 325 MG/1; MG/1
1 TABLET ORAL EVERY 4 HOURS PRN
Status: DISCONTINUED | OUTPATIENT
Start: 2019-05-21 | End: 2019-05-21

## 2019-05-21 RX ORDER — MORPHINE SULFATE 10 MG/ML
6 INJECTION, SOLUTION INTRAMUSCULAR; INTRAVENOUS EVERY 10 MIN PRN
Status: DISCONTINUED | OUTPATIENT
Start: 2019-05-21 | End: 2019-05-21

## 2019-05-21 RX ORDER — DEXAMETHASONE SODIUM PHOSPHATE 4 MG/ML
VIAL (ML) INJECTION AS NEEDED
Status: DISCONTINUED | OUTPATIENT
Start: 2019-05-21 | End: 2019-05-21 | Stop reason: SURG

## 2019-05-21 RX ORDER — MORPHINE SULFATE 4 MG/ML
4 INJECTION, SOLUTION INTRAMUSCULAR; INTRAVENOUS EVERY 10 MIN PRN
Status: DISCONTINUED | OUTPATIENT
Start: 2019-05-21 | End: 2019-05-21

## 2019-05-21 RX ORDER — SODIUM CHLORIDE, SODIUM LACTATE, POTASSIUM CHLORIDE, CALCIUM CHLORIDE 600; 310; 30; 20 MG/100ML; MG/100ML; MG/100ML; MG/100ML
INJECTION, SOLUTION INTRAVENOUS CONTINUOUS
Status: DISCONTINUED | OUTPATIENT
Start: 2019-05-21 | End: 2019-05-21

## 2019-05-21 RX ORDER — ACETAMINOPHEN 500 MG
1000 TABLET ORAL ONCE
Status: COMPLETED | OUTPATIENT
Start: 2019-05-21 | End: 2019-05-21

## 2019-05-21 RX ADMIN — SODIUM CHLORIDE, SODIUM LACTATE, POTASSIUM CHLORIDE, CALCIUM CHLORIDE: 600; 310; 30; 20 INJECTION, SOLUTION INTRAVENOUS at 07:36:00

## 2019-05-21 RX ADMIN — DEXAMETHASONE SODIUM PHOSPHATE 4 MG: 4 MG/ML VIAL (ML) INJECTION at 07:48:00

## 2019-05-21 RX ADMIN — SODIUM CHLORIDE, SODIUM LACTATE, POTASSIUM CHLORIDE, CALCIUM CHLORIDE: 600; 310; 30; 20 INJECTION, SOLUTION INTRAVENOUS at 08:19:00

## 2019-05-21 RX ADMIN — ONDANSETRON 4 MG: 2 INJECTION INTRAMUSCULAR; INTRAVENOUS at 07:48:00

## 2019-05-21 RX ADMIN — MIDAZOLAM HYDROCHLORIDE 2 MG: 1 INJECTION INTRAMUSCULAR; INTRAVENOUS at 07:36:00

## 2019-05-21 RX ADMIN — LIDOCAINE HYDROCHLORIDE 50 MG: 10 INJECTION, SOLUTION EPIDURAL; INFILTRATION; INTRACAUDAL; PERINEURAL at 07:39:00

## 2019-05-21 NOTE — INTERVAL H&P NOTE
Pre-op Diagnosis: Microhematuria [R31.29]    The above referenced H&P was reviewed by Salo Salamanca MD on 5/21/2019, the patient was examined and no significant changes have occurred in the patient's condition since the H&P was performed.   I discussed

## 2019-05-21 NOTE — ANESTHESIA POSTPROCEDURE EVALUATION
Patient: Janelle Suero    Procedure Summary     Date:  05/21/19 Room / Location:  [address] [place] [address] MAIN OR 14 / [address] [place] [address] MAIN OR    Anesthesia Start:  6156 Anesthesia Stop:  3798    Procedures:       CYSTOSCOPY RETROGRADE (Bilateral )      URINARY BIOPSY (N/A ) Diagnos

## 2019-05-21 NOTE — BRIEF OP NOTE
[facility] POST ANESTHESIA CARE UNIT  Brief Op Note       Patients Name: Allison Conway  Attending Physician: Nesha Burrows MD  Operating Physician: Florin Baldwin MD  CSN: [id number]     Location:  OR  MRN: [id number]    Date of Birth: [birth year]

## 2019-05-21 NOTE — OPERATIVE REPORT
[facility]    PATIENT'S NAME: Danny Larry   ATTENDING PHYSICIAN: Beather Cooks, MD   OPERATING PHYSICIAN: Beather Cooks, MD   PATIENT ACCOUNT#:   [id number]    LOCATION:  [place] [address]  MEDICAL RECORD #:   [id number] circumferentially erythematous, but there is no evidence of such mild erythema elsewhere in the bladder.   On performing cystoscopy from the very beginning, there was some floating desquamated cells present in the bladder, not inspecting the orifices, 1 to sterile water. The fluid seen shooting out of the orifices that was sent to Cytology, and I commented on the appearance. At the end of the case, there was no bleeding. The bladder was emptied, and all instruments removed.   Intraoperatively, had Teleradi

## 2019-05-21 NOTE — ANESTHESIA PREPROCEDURE EVALUATION
Anesthesia PreOp Note    HPI:     Kevin Cazares is a 34year old female who presents for preoperative consultation requested by: Aditya Johnson MD    Date of Surgery: 5/21/2019    Procedure(s):  CYSTOSCOPY RETROGRADE  URINARY BIOPSY  Indication: M on file      Highest education level: Not on file    Occupational History      Not on file    Social Needs      Financial resource strain: Not on file      Food insecurity:        Worry: Not on file        Inability: Not on file      Transportation needs: file      Available pre-op labs reviewed.   Lab Results   Component Value Date    WBC 7.8 04/22/2019    RBC 5.22 04/22/2019    HGB 14.2 04/22/2019    HCT 45.7 04/22/2019    MCV 87.5 04/22/2019    MCH 27.2 04/22/2019    MCHC 31.1 04/22/2019    RDW 13.6 04/22 anesthetic management as planned.   IWONA RONQUILLO  5/21/2019 7:12 AM

## 2019-05-21 NOTE — H&P
Fela Cuevas MD   Physician   UROLOGY   Progress Notes   Signed   Encounter Date:  5/6/2019               Signed               Ulysses Malay is a 34year old female.     Reason for Consultation:   Patient presents with:  UTI: pressure, discomfort Patient states she has passed several kidney stones spontaneously. She reports presenting to [address] [place] [address] ER when she has episodes. She denies any recent renal colic or flank pain. Patient feels this is stable.      Review of previous records:    5/29/2008 CT showed metRONIDAZOLE (FLAGYL) 500 MG Oral Tab Take 1 tablet (500 mg total) by mouth 2 (two) times daily for 7 days.  Disp: 14 tablet Rfl: 0   Albuterol Sulfate HFA (PROAIR HFA) 108 (90 Base) MCG/ACT Inhalation Aero Soln Inhale 2 puffs into the lungs every 4 (four) Exam appropriate for age; patellar reflexes Normal bilaterally  Head/Face: normocephalic  Eyes/Vision: no scleral icterus  Neck/Thyroid: neck is supple without adenopathy  Lymphatic: no abnormal cervical, supraclavicular or inguinal  adenopathy is noted  R 4/22/2019 CT abdomen + pelvis (no IV no oral) =  Normal kidneys;  No lymphadenopathy to pelvic nodes or retroperitoneum.     7/4/2015 CT showed no obvious stones     2/28/2012 CT Abdomen and pelvis (WO) = previously noted tiny nonobstructing stones in the r Patient complains of intermittent pain/pressure in the suprapubic area with urination. Patient states that after taking Azo she will have relief but after 3 days her symptoms return. She notes she feels like something is \"stuck\" when she urinates.  Angie 2.  You must take the metronidazole gel prescribed by her other doctors for your the culture proven genital vaginosis screen that you had on 4/29/2019; must wait and see if your symptoms go away after you take the prescribed medication.   Nonetheless, jose martin

## 2019-05-21 NOTE — ANESTHESIA PROCEDURE NOTES
Airway  Urgency: elective    Airway not difficult    General Information and Staff    Patient location during procedure: OR  Anesthesiologist: Cecilia Garland MD  Resident/CRNA: Juan Lowry CRNA  Performed: CRNA     Indications and Patient C

## 2019-05-22 ENCOUNTER — TELEPHONE (OUTPATIENT)
Dept: SURGERY | Facility: CLINIC | Age: 29
End: 2019-05-22

## 2019-05-23 NOTE — TELEPHONE ENCOUNTER
I sent patient the following message by means of \"my chart\" =  \"  Dear Rochelle Johnson,  5/21/2019 hospital cystoscopy procedure showed no cancers or stones. The bilateral retrograde pyelogram x-rays of the kidneys during a cystoscopy, performed were read as being unremarkable and normal by the radiologist.  Urine for cytology, obtained from your urinary tract, showed no cancer; it showed some clusters of benign squamous cells, but I discussed this matter by phone with pathologist/microscope specialist Dr. Brett Warner, and she states that these cells are not cancerous and they ARE NOT precancerous. They do not require any treatment. I cannot find any urinary tract or urology cause for your pelvic discomfort. I do not recommend any further urology procedures are urology testing at this time. If you are still having pelvic pain, I recommend that you go back for an opinion from your gynecologist; if the gynecologist cannot find a cause for your pelvic pain, then consider seeking referral for consult to [place] [address] pelvic floor physical therapy which is located in [facility]; you could likely obtain such a referral either from your gynecologist or from Dr. Elizabeth Dsouza.    I wish you the best of health, Antonieta Nur M.D.  \"

## 2019-06-26 ENCOUNTER — OFFICE VISIT (OUTPATIENT)
Dept: DERMATOLOGY CLINIC | Facility: CLINIC | Age: 29
End: 2019-06-26
Payer: COMMERCIAL

## 2019-06-26 DIAGNOSIS — L70.0 ACNE VULGARIS: Primary | ICD-10-CM

## 2019-06-26 PROCEDURE — 99203 OFFICE O/P NEW LOW 30 MIN: CPT | Performed by: DERMATOLOGY

## 2019-06-26 RX ORDER — TAZAROTENE 1 MG/G
CREAM TOPICAL
Qty: 60 G | Refills: 6 | Status: SHIPPED | OUTPATIENT
Start: 2019-06-26 | End: 2020-10-20

## 2019-06-26 RX ORDER — DAPSONE 50 MG/G
GEL TOPICAL
Qty: 60 G | Refills: 3 | COMMUNITY
Start: 2019-06-26 | End: 2019-06-26

## 2019-06-26 RX ORDER — SPIRONOLACTONE 25 MG/1
25 TABLET ORAL 2 TIMES DAILY
Qty: 60 TABLET | Refills: 3 | Status: SHIPPED | OUTPATIENT
Start: 2019-06-26 | End: 2019-09-23

## 2019-06-26 RX ORDER — DAPSONE 50 MG/G
GEL TOPICAL
Qty: 60 G | Refills: 3 | Status: SHIPPED | OUTPATIENT
Start: 2019-06-26 | End: 2020-10-20

## 2019-07-07 NOTE — PROGRESS NOTES
Lorenza Bowden is a 34year old female. Patient presents with:  Acne: New Patient present with acne . Patient states shes tried many otc product with no relief             Patient has no known allergies.     Current Outpatient Medications:  spironola Date   • CYSTOSCOPY RETROGRADE Bilateral 5/21/2019    Performed by Robbie Zelaya MD at [address] [place] [address] MAIN OR   • URINARY BIOPSY N/A 5/21/2019    Performed by Robbie Zelaya MD at [address] [place] [address] MAIN OR     Social History    Socioeconomic History      Marital status: S Asked        Hobby Hazards: Not Asked        Sleep Concern: Not Asked        Stress Concern: Not Asked        Weight Concern: Not Asked        Special Diet: Not Asked        Back Care: Not Asked        Exercise: Not Asked        Bike Helmet: Not Asked prominent atrophic erythematous macules   , prominent postinflammatory erythema  , hyperpigmentation  Over  Cheeks, jaw line.   Grade 3 prominent scarring ice pick scars, postinflammatory changes large inflammatory cystic nodules    Back, chest, shoulders, times daily.    • Tazarotene (TAZORAC) 0.1 % External Cream 60 g 6     Sig: Use qhs for acne   • Dapsone (ACZONE) 5 % External Gel 60 g 3     Sig: Use bid       Acne vulgaris  (primary encounter diagnosis)    Orders Placed This Encounter      Seema

## 2019-09-23 PROBLEM — F11.21 OPIOID USE DISORDER, SEVERE, IN SUSTAINED REMISSION (HCC): Status: ACTIVE | Noted: 2019-09-23

## 2019-09-23 PROBLEM — F12.20 CANNABIS USE DISORDER, SEVERE, DEPENDENCE (HCC): Status: ACTIVE | Noted: 2019-09-23

## 2019-09-23 PROBLEM — Z78.9 VEGETARIAN DIET: Status: ACTIVE | Noted: 2019-09-23

## 2019-09-23 PROBLEM — F41.1 GENERALIZED ANXIETY DISORDER: Status: ACTIVE | Noted: 2019-09-23

## 2019-09-23 PROBLEM — F32.9 MAJOR DEPRESSIVE DISORDER: Status: ACTIVE | Noted: 2019-09-23

## 2019-10-01 ENCOUNTER — LAB ENCOUNTER (OUTPATIENT)
Dept: LAB | Age: 29
End: 2019-10-01
Attending: PHYSICIAN ASSISTANT
Payer: COMMERCIAL

## 2019-10-01 DIAGNOSIS — Z13.220 SCREENING FOR LIPID DISORDERS: ICD-10-CM

## 2019-10-01 DIAGNOSIS — F41.1 GENERALIZED ANXIETY DISORDER: ICD-10-CM

## 2019-10-01 DIAGNOSIS — Z32.00 ENCOUNTER FOR PREGNANCY TEST, RESULT UNKNOWN: ICD-10-CM

## 2019-10-01 DIAGNOSIS — Z13.1 SCREENING FOR DIABETES MELLITUS: ICD-10-CM

## 2019-10-01 DIAGNOSIS — Z13.0 SCREENING FOR DEFICIENCY ANEMIA: ICD-10-CM

## 2019-10-01 DIAGNOSIS — Z13.21 ENCOUNTER FOR VITAMIN DEFICIENCY SCREENING: ICD-10-CM

## 2019-10-01 DIAGNOSIS — Z13.29 SCREENING FOR THYROID DISORDER: ICD-10-CM

## 2019-10-01 DIAGNOSIS — F32.A DEPRESSIVE DISORDER: ICD-10-CM

## 2019-10-01 PROCEDURE — 82306 VITAMIN D 25 HYDROXY: CPT

## 2019-10-01 PROCEDURE — 80061 LIPID PANEL: CPT

## 2019-10-01 PROCEDURE — 84702 CHORIONIC GONADOTROPIN TEST: CPT

## 2019-10-01 PROCEDURE — 80053 COMPREHEN METABOLIC PANEL: CPT

## 2019-10-01 PROCEDURE — 83036 HEMOGLOBIN GLYCOSYLATED A1C: CPT

## 2019-10-01 PROCEDURE — 84443 ASSAY THYROID STIM HORMONE: CPT

## 2019-10-01 PROCEDURE — 85025 COMPLETE CBC W/AUTO DIFF WBC: CPT

## 2019-10-01 PROCEDURE — 36415 COLL VENOUS BLD VENIPUNCTURE: CPT

## 2019-10-01 PROCEDURE — 84439 ASSAY OF FREE THYROXINE: CPT

## 2019-10-02 PROBLEM — E55.9 VITAMIN D DEFICIENCY: Status: ACTIVE | Noted: 2019-10-02

## 2019-10-02 PROBLEM — F31.9 BIPOLAR DISORDER (HCC): Status: ACTIVE | Noted: 2019-09-23

## 2019-10-16 PROBLEM — F14.10 COCAINE USE DISORDER (HCC): Status: ACTIVE | Noted: 2019-10-16

## 2020-01-08 ENCOUNTER — NURSE ONLY (OUTPATIENT)
Dept: INTERNAL MEDICINE CLINIC | Facility: CLINIC | Age: 30
End: 2020-01-08
Payer: COMMERCIAL

## 2020-01-08 ENCOUNTER — TELEPHONE (OUTPATIENT)
Dept: INTERNAL MEDICINE CLINIC | Facility: CLINIC | Age: 30
End: 2020-01-08

## 2020-01-08 DIAGNOSIS — Z23 NEED FOR INFLUENZA VACCINATION: Primary | ICD-10-CM

## 2020-01-08 PROCEDURE — 90471 IMMUNIZATION ADMIN: CPT | Performed by: INTERNAL MEDICINE

## 2020-01-08 PROCEDURE — 90686 IIV4 VACC NO PRSV 0.5 ML IM: CPT | Performed by: INTERNAL MEDICINE

## 2020-01-08 PROCEDURE — 90715 TDAP VACCINE 7 YRS/> IM: CPT | Performed by: INTERNAL MEDICINE

## 2020-01-08 PROCEDURE — 90472 IMMUNIZATION ADMIN EACH ADD: CPT | Performed by: INTERNAL MEDICINE

## 2020-01-08 NOTE — PROGRESS NOTES
Pt here for Tdap and Flulaval. Pt denies chance of pregnancy. Pt stts her friend is having a baby and she was told to get vaccines since she will be around baby. Ok per Dr. Samara Buckner for pt to get Tdap if not pregnant.

## 2020-01-08 NOTE — TELEPHONE ENCOUNTER
Dr. Sweta Cunha is scheduled for [place] today for Tdap. Spoke with pt stts she needs Tdap because her friend is having a baby. No Immunization record in chart. Please advise.  LOV 3/25/19

## 2020-07-26 ENCOUNTER — HOSPITAL ENCOUNTER (EMERGENCY)
Facility: HOSPITAL | Age: 30
Discharge: HOME | End: 2020-07-26
Attending: EMERGENCY MEDICINE
Payer: MEDICAID

## 2020-07-26 ENCOUNTER — HOSPITAL ENCOUNTER (OUTPATIENT)
Age: 30
Discharge: ANOTHER HOSPITAL | End: 2020-07-26
Attending: FAMILY MEDICINE
Payer: MEDICAID

## 2020-07-26 VITALS
HEART RATE: 72 BPM | SYSTOLIC BLOOD PRESSURE: 147 MMHG | WEIGHT: 143 LBS | OXYGEN SATURATION: 99 % | TEMPERATURE: 99 F | RESPIRATION RATE: 16 BRPM | BODY MASS INDEX: 23.82 KG/M2 | DIASTOLIC BLOOD PRESSURE: 104 MMHG | HEIGHT: 65 IN

## 2020-07-26 VITALS
RESPIRATION RATE: 20 BRPM | OXYGEN SATURATION: 98 % | TEMPERATURE: 98 F | SYSTOLIC BLOOD PRESSURE: 146 MMHG | DIASTOLIC BLOOD PRESSURE: 108 MMHG | HEART RATE: 88 BPM

## 2020-07-26 DIAGNOSIS — K64.5 THROMBOSED HEMORRHOIDS: Primary | ICD-10-CM

## 2020-07-26 DIAGNOSIS — K64.9 HEMORRHOIDS, UNSPECIFIED HEMORRHOID TYPE: Primary | ICD-10-CM

## 2020-07-26 PROCEDURE — 99283 EMERGENCY DEPT VISIT LOW MDM: CPT

## 2020-07-26 PROCEDURE — 99204 OFFICE O/P NEW MOD 45 MIN: CPT | Performed by: FAMILY MEDICINE

## 2020-07-26 RX ORDER — POLYETHYLENE GLYCOL 3350 17 G/17G
17 POWDER, FOR SOLUTION ORAL DAILY PRN
Qty: 12 EACH | Refills: 0 | Status: SHIPPED | OUTPATIENT
Start: 2020-07-26 | End: 2020-08-25

## 2020-07-26 RX ORDER — DOCUSATE SODIUM 100 MG/1
100 CAPSULE, LIQUID FILLED ORAL 2 TIMES DAILY
Qty: 60 CAPSULE | Refills: 0 | Status: SHIPPED | OUTPATIENT
Start: 2020-07-26 | End: 2020-08-25

## 2020-07-26 RX ORDER — LIDOCAINE HYDROCHLORIDE 20 MG/ML
10 JELLY TOPICAL ONCE
Status: COMPLETED | OUTPATIENT
Start: 2020-07-26 | End: 2020-07-26

## 2020-07-26 RX ORDER — MAGNESIUM CARB/ALUMINUM HYDROX 105-160MG
296 TABLET,CHEWABLE ORAL ONCE
Qty: 296 ML | Refills: 0 | Status: SHIPPED | OUTPATIENT
Start: 2020-07-26 | End: 2020-07-26

## 2020-07-26 NOTE — ED PROVIDER NOTES
Patient Seen in: [facility] [place]      History   Patient presents with:  Anal Problem    Stated Complaint: rectal pain    HPI    Pt is a 26 yo with a large painful hemorrhoid.  Try OTC meds    Past Medical History:   Diagnosis appearing hemorrhoid  Skin:     General: Skin is warm. Capillary Refill: Capillary refill takes less than 2 seconds. Neurological:      General: No focal deficit present. Mental Status: She is alert and oriented to person, place, and time.    Ps

## 2020-07-26 NOTE — ED NOTES
Reports some relief with the Lidocaine jelly \"but I'm still feeling some \"burning / stinging. \" Discharge instructions and RXs reviewed with Varsha JACOBS to arrange for follow-up with general surgeon. Also encouraged follow-up with CECI Damon re: HTN.

## 2020-07-26 NOTE — CM/SW NOTE
Cm met with patient at bedside    Cm team will make appointment on 7/26 for close follow up with surgeon Dr Hima Montgomery or group    CM will notify patient ([phone]) of appointment date and time    patient agreeable

## 2020-07-26 NOTE — ED INITIAL ASSESSMENT (HPI)
Patient with rectal pain since yesterday  Pt with hx of hemorrhoids  +bleeding  Patient using preparation H, Tucks Pads, and Tylenol to manage pain

## 2020-07-26 NOTE — ED INITIAL ASSESSMENT (HPI)
PATIENT AOX3 TO ED VIA PRIVATE VEHICLE PATIENT CO OF HEMORRHOIDS X FEW DAYS, +LAST BM X YESTERDAY, CONSTIPATED PAIN 9/10 DENIES DIARRHEA DENIES ABD PAIN

## 2020-07-26 NOTE — ED NOTES
Ciara Guerra presents through triage for c/o rectal pain secondary to possible thrombosed hemorrhoid. She was referred to the ED by IC for further eval. Reports known hemorrhoid which became more painful yesterday.  She is tearful on exam. Minimal to no relief w

## 2020-07-26 NOTE — ED PROVIDER NOTES
Patient Seen in: [facility] AND [facility] Emergency Department      History   Patient presents with:  Hemorrhoids    Stated Complaint:     HPI    80-year-old female with history of chronic constipation for the past 6 months, intermittent hemorrhoids, here with pain. Negative for abdominal pain. Genitourinary: Negative for dysuria. Musculoskeletal: Negative for back pain. Skin: Negative for rash. Neurological: Negative for dizziness. Psychiatric/Behavioral: Negative for suicidal ideas.    All other syste found.      EMERGENCY DEPARTMENT COURSE AND TREATMENT:  Patient's condition was stable during Emergency Department evaluation.      30yoF with hemorrhoid pain  - I personally reviewed and interpreted all the ED vitals  - afebrile, hemodynamically stable  - diagnostics, multiple initial diagnoses were considered based on the presenting problem including hemorrhoid, thrombosed hemorrhoid, prolapsed rectum, anal fissure.           Disposition and Plan     Clinical Impression:  Hemorrhoids, unspecified hemorrhoid

## 2020-07-27 NOTE — CM/SW NOTE
Faxed 26 pages of pertinent medical records including face sheet, ERCM note and ER encounter summary to [facility]/ [facility]ER[facility] @ [phone] - confirmation rec'd.  Also left detailed voice message for Mikey Montana @ [phone] informing her ERCM sc

## 2020-07-27 NOTE — CM/SW NOTE
Rec'd handoff report from [facility] to schedule patient appointment with Dr. Daphnie Luis due to hemorrhoids, however patient has no insurance.  Called and verified this with Yakov Medina in registration whom states patient did have [address] out of [facility]

## 2020-07-28 NOTE — CM/SW NOTE
Call from patient, she is at the A in [place] for her appointment    The office in [place] is closed today    Cm spoke with [facility] and was able to schedule an appointment at [place] [address] in [address] [place] [address] for today at 2pm    patient states \"I am o

## 2020-08-28 ENCOUNTER — HOSPITAL ENCOUNTER (OUTPATIENT)
Age: 30
Discharge: HOME | End: 2020-08-28
Attending: EMERGENCY MEDICINE
Payer: MEDICAID

## 2020-08-28 VITALS
BODY MASS INDEX: 24.16 KG/M2 | RESPIRATION RATE: 18 BRPM | TEMPERATURE: 98 F | HEIGHT: 65 IN | OXYGEN SATURATION: 98 % | HEART RATE: 88 BPM | DIASTOLIC BLOOD PRESSURE: 91 MMHG | WEIGHT: 145 LBS | SYSTOLIC BLOOD PRESSURE: 136 MMHG

## 2020-08-28 DIAGNOSIS — Z20.822 ENCOUNTER FOR LABORATORY TESTING FOR COVID-19 VIRUS: Primary | ICD-10-CM

## 2020-08-28 DIAGNOSIS — J02.0 STREPTOCOCCAL SORE THROAT: ICD-10-CM

## 2020-08-28 LAB — S PYO AG THROAT QL: NEGATIVE

## 2020-08-28 PROCEDURE — 87880 STREP A ASSAY W/OPTIC: CPT | Performed by: EMERGENCY MEDICINE

## 2020-08-28 PROCEDURE — 99213 OFFICE O/P EST LOW 20 MIN: CPT | Performed by: EMERGENCY MEDICINE

## 2020-08-28 RX ORDER — AMOXICILLIN 875 MG/1
875 TABLET, COATED ORAL 2 TIMES DAILY
Qty: 20 TABLET | Refills: 0 | Status: SHIPPED | OUTPATIENT
Start: 2020-08-28 | End: 2020-09-07

## 2020-08-28 NOTE — ED NOTES
Discharge inst and f/u care prescription given per Sabina.  F/u with my chart for results quarantine wear mask wash hands social distance follow state mandates

## 2020-08-28 NOTE — ED PROVIDER NOTES
Patient Seen in: [facility] [place]    History   No chief complaint on file. Stated Complaint: Covid Test, Exposure, Cough, Scratchy Throat, SOB    HPI    Patient here with cough, congestion for 3 days.   No travel, no known s Father    • Hypertension Father    • Heart Disorder Maternal Grandfather         CHF   • Anxiety Neg    • Depression Neg    • Bipolar Disorder Neg    • Schizophrenia Neg    • ADHD Neg    • OCD Neg    • Substance Abuse Neg    • Suicide History Neg      Fami clubbing or edema  GI: soft, non-tender, normal bowel sounds  SKIN: good skin turgor, no obvious rashes  Differential to include: URI vs. rhinonsinusitis vs. Bronchitis vs. Pneumonia         ED Course     Labs Reviewed   SARS-COV-2 BY PCR ()   SARS-CO

## 2020-08-30 LAB — SARS-COV-2 RNA RESP QL NAA+PROBE: NOT DETECTED

## 2020-10-12 ENCOUNTER — TELEPHONE (OUTPATIENT)
Dept: OBGYN CLINIC | Facility: CLINIC | Age: 30
End: 2020-10-12

## 2020-10-12 DIAGNOSIS — N92.6 MISSED MENSES: Primary | ICD-10-CM

## 2020-10-12 NOTE — TELEPHONE ENCOUNTER
Pt states she might be pregnant states her LMP was 9/9 and got a positive HPT, scheduled pt for a missed menses on 10/19 but requesting an order for a blood pregnancy test. Please advise

## 2020-10-17 ENCOUNTER — LAB ENCOUNTER (OUTPATIENT)
Dept: LAB | Age: 30
End: 2020-10-17
Attending: ADVANCED PRACTICE MIDWIFE
Payer: COMMERCIAL

## 2020-10-17 DIAGNOSIS — N92.6 MISSED MENSES: ICD-10-CM

## 2020-10-17 PROCEDURE — 36415 COLL VENOUS BLD VENIPUNCTURE: CPT

## 2020-10-17 PROCEDURE — 84144 ASSAY OF PROGESTERONE: CPT

## 2020-10-17 PROCEDURE — 84702 CHORIONIC GONADOTROPIN TEST: CPT

## 2020-10-19 ENCOUNTER — TELEPHONE (OUTPATIENT)
Dept: OBGYN CLINIC | Facility: CLINIC | Age: 30
End: 2020-10-19

## 2020-10-19 NOTE — TELEPHONE ENCOUNTER
----- Message from EUSEBIO Aguero sent at 10/18/2020 12:32 AM CDT -----  Pt needs Missed menses appt

## 2020-10-20 ENCOUNTER — APPOINTMENT (OUTPATIENT)
Dept: ULTRASOUND IMAGING | Facility: HOSPITAL | Age: 30
End: 2020-10-20
Attending: NURSE PRACTITIONER
Payer: COMMERCIAL

## 2020-10-20 ENCOUNTER — HOSPITAL ENCOUNTER (EMERGENCY)
Facility: HOSPITAL | Age: 30
Discharge: HOME | End: 2020-10-20
Payer: COMMERCIAL

## 2020-10-20 ENCOUNTER — TELEPHONE (OUTPATIENT)
Dept: OBGYN CLINIC | Facility: CLINIC | Age: 30
End: 2020-10-20

## 2020-10-20 VITALS
DIASTOLIC BLOOD PRESSURE: 68 MMHG | HEIGHT: 65 IN | HEART RATE: 90 BPM | OXYGEN SATURATION: 99 % | BODY MASS INDEX: 24.99 KG/M2 | TEMPERATURE: 98 F | RESPIRATION RATE: 16 BRPM | WEIGHT: 150 LBS | SYSTOLIC BLOOD PRESSURE: 130 MMHG

## 2020-10-20 DIAGNOSIS — O41.8X10 SUBCHORIONIC HEMORRHAGE OF PLACENTA IN FIRST TRIMESTER, SINGLE OR UNSPECIFIED FETUS: ICD-10-CM

## 2020-10-20 DIAGNOSIS — O20.9 BLEEDING IN EARLY PREGNANCY: Primary | ICD-10-CM

## 2020-10-20 DIAGNOSIS — O46.8X1 SUBCHORIONIC HEMORRHAGE OF PLACENTA IN FIRST TRIMESTER, SINGLE OR UNSPECIFIED FETUS: ICD-10-CM

## 2020-10-20 PROCEDURE — 84702 CHORIONIC GONADOTROPIN TEST: CPT

## 2020-10-20 PROCEDURE — 81001 URINALYSIS AUTO W/SCOPE: CPT | Performed by: NURSE PRACTITIONER

## 2020-10-20 PROCEDURE — 36415 COLL VENOUS BLD VENIPUNCTURE: CPT

## 2020-10-20 PROCEDURE — 85025 COMPLETE CBC W/AUTO DIFF WBC: CPT

## 2020-10-20 PROCEDURE — 99284 EMERGENCY DEPT VISIT MOD MDM: CPT

## 2020-10-20 PROCEDURE — 76801 OB US < 14 WKS SINGLE FETUS: CPT | Performed by: NURSE PRACTITIONER

## 2020-10-20 PROCEDURE — 86901 BLOOD TYPING SEROLOGIC RH(D): CPT

## 2020-10-20 PROCEDURE — 76817 TRANSVAGINAL US OBSTETRIC: CPT | Performed by: NURSE PRACTITIONER

## 2020-10-20 PROCEDURE — 86900 BLOOD TYPING SEROLOGIC ABO: CPT

## 2020-10-20 NOTE — TELEPHONE ENCOUNTER
Pt Name and  verified. Pt states that she is experiencing some bleeding. Denies any pelvic pain/cramping. Pt noticed the bleeding just now when she used the restroom. Pt's last time she had intercourse was over a week ago.  Pt has a hx of miscarriage

## 2020-10-20 NOTE — ED INITIAL ASSESSMENT (HPI)
Pt to ED with c/o vaginal spotting while wiping without pelvic cramping that started this am. Pt states she is 5 weeks pregnant. Pt . Pt denies fall or trauma. Pt had HCGQ done recently.

## 2020-10-20 NOTE — ED PROVIDER NOTES
Patient Seen in: [facility] AND [facility] Emergency Department      History   Patient presents with:  Pregnancy Issues    Stated Complaint: 6 weeks preg, bleeding    HPI    22-year-old female presents to the emergency department complaining of vaginal bleeding dry.      Capillary Refill: Capillary refill takes less than 2 seconds. Neurological:      General: No focal deficit present. Mental Status: She is alert.              ED Course     Labs Reviewed   HCG, BETA SUBUNIT (QUANT PREGNANCY TEST) - Abnormal; pain strict return precautions given no bleeding presently at discharge    MDM          I have given the patient instructions regarding their diagnoses, expectations, follow up, and return to the ER precautions.  I explained to the patient that emergent con

## 2020-10-21 ENCOUNTER — OFFICE VISIT (OUTPATIENT)
Dept: OBGYN CLINIC | Facility: CLINIC | Age: 30
End: 2020-10-21
Payer: COMMERCIAL

## 2020-10-21 VITALS — DIASTOLIC BLOOD PRESSURE: 62 MMHG | BODY MASS INDEX: 26 KG/M2 | SYSTOLIC BLOOD PRESSURE: 110 MMHG | WEIGHT: 158 LBS

## 2020-10-21 DIAGNOSIS — O20.0 THREATENED ABORTION, ANTEPARTUM: ICD-10-CM

## 2020-10-21 DIAGNOSIS — N92.6 MISSED MENSES: Primary | ICD-10-CM

## 2020-10-21 PROCEDURE — 99213 OFFICE O/P EST LOW 20 MIN: CPT | Performed by: OBSTETRICS & GYNECOLOGY

## 2020-10-21 PROCEDURE — 3078F DIAST BP <80 MM HG: CPT | Performed by: OBSTETRICS & GYNECOLOGY

## 2020-10-21 PROCEDURE — 3074F SYST BP LT 130 MM HG: CPT | Performed by: OBSTETRICS & GYNECOLOGY

## 2020-10-21 PROCEDURE — 81025 URINE PREGNANCY TEST: CPT | Performed by: OBSTETRICS & GYNECOLOGY

## 2020-10-21 NOTE — PROGRESS NOTES
HPI:    Patient ID: Kelsey Landin is a 27year old female. Patient here for PCV. Had OB U/S yesterday due to vaginal bleeding and threatened Ab. Large subchorionic bleed noted. Patient reports no bleeding ar cramping today.   FHTs ~ 104 bpm.  Dis

## 2020-10-22 ENCOUNTER — PATIENT MESSAGE (OUTPATIENT)
Dept: OBGYN CLINIC | Facility: CLINIC | Age: 30
End: 2020-10-22

## 2020-11-05 ENCOUNTER — HOSPITAL ENCOUNTER (OUTPATIENT)
Dept: ULTRASOUND IMAGING | Age: 30
Discharge: HOME | End: 2020-11-05
Attending: OBSTETRICS & GYNECOLOGY
Payer: COMMERCIAL

## 2020-11-05 DIAGNOSIS — O20.0 THREATENED ABORTION, ANTEPARTUM: ICD-10-CM

## 2020-11-05 PROCEDURE — 76801 OB US < 14 WKS SINGLE FETUS: CPT | Performed by: OBSTETRICS & GYNECOLOGY

## 2020-11-05 PROCEDURE — 76817 TRANSVAGINAL US OBSTETRIC: CPT | Performed by: OBSTETRICS & GYNECOLOGY

## 2020-11-08 ENCOUNTER — PATIENT MESSAGE (OUTPATIENT)
Dept: OBGYN CLINIC | Facility: CLINIC | Age: 30
End: 2020-11-08

## 2020-11-09 NOTE — TELEPHONE ENCOUNTER
If patient is having a First Trimester screen then that will be her next U/S. If not, then her next U/S will be at 20 weeks. Call patient.

## 2020-11-23 ENCOUNTER — HOSPITAL ENCOUNTER (OUTPATIENT)
Age: 30
Discharge: HOME | End: 2020-11-23
Payer: COMMERCIAL

## 2020-11-23 VITALS
DIASTOLIC BLOOD PRESSURE: 86 MMHG | TEMPERATURE: 99 F | RESPIRATION RATE: 16 BRPM | OXYGEN SATURATION: 99 % | SYSTOLIC BLOOD PRESSURE: 135 MMHG | HEIGHT: 64 IN | WEIGHT: 160 LBS | BODY MASS INDEX: 27.31 KG/M2 | HEART RATE: 77 BPM

## 2020-11-23 DIAGNOSIS — G44.89 OTHER HEADACHE SYNDROME: ICD-10-CM

## 2020-11-23 DIAGNOSIS — Z20.822 ENCOUNTER FOR LABORATORY TESTING FOR COVID-19 VIRUS: Primary | ICD-10-CM

## 2020-11-23 DIAGNOSIS — R53.83 FATIGUE, UNSPECIFIED TYPE: ICD-10-CM

## 2020-11-23 PROCEDURE — 99213 OFFICE O/P EST LOW 20 MIN: CPT | Performed by: EMERGENCY MEDICINE

## 2020-11-23 NOTE — ED INITIAL ASSESSMENT (HPI)
Pt presents to the IC with c/o needing testing for covid. Pt notes the following symptoms: headache, nausea and fatigue. Pt denies cough or nasal congestion. Reports being in her first trimester in pregnancy, approx 11 weeks.

## 2020-11-23 NOTE — ED PROVIDER NOTES
Patient Seen in: [facility]      History   Patient presents with:  Testing    Stated Complaint: Exposure, symptoms    Jesenia Gonsalves is a 27year old  female here for Covid testing after exposure and sx of Nausea, fatigue, and HA.   Angie vape. last 9/17/19             Review of Systems    Positive for stated complaint: Exposure, symptoms  Other systems are as noted in HPI. Constitutional and vital signs reviewed. All other systems reviewed and negative except as noted above.     Physi GCS: GCS eye subscore is 4. GCS verbal subscore is 5. GCS motor subscore is 6. Psychiatric:         Mood and Affect: Mood normal.         Behavior: Behavior normal.         Thought Content:  Thought content normal.         Judgment: Judgment normal.

## 2020-11-30 ENCOUNTER — PATIENT MESSAGE (OUTPATIENT)
Dept: OBGYN CLINIC | Facility: CLINIC | Age: 30
End: 2020-11-30

## 2020-11-30 DIAGNOSIS — Z34.81 ENCOUNTER FOR SUPERVISION OF OTHER NORMAL PREGNANCY IN FIRST TRIMESTER: Primary | ICD-10-CM

## 2020-12-28 ENCOUNTER — TELEPHONE (OUTPATIENT)
Dept: OBGYN CLINIC | Facility: CLINIC | Age: 30
End: 2020-12-28

## 2020-12-28 NOTE — TELEPHONE ENCOUNTER
16w  OB History     T0    L0    SAB1  TAB2  Ectopic0  Multiple0  Live Births0     Patient name and  verified. RN education visit scheduled on 2021 and will need new ob appointment made at time of visit.  Has PN appt on 2021 with DELFINA

## 2021-01-13 ENCOUNTER — NURSE ONLY (OUTPATIENT)
Dept: OBGYN CLINIC | Facility: CLINIC | Age: 31
End: 2021-01-13
Payer: COMMERCIAL

## 2021-01-13 DIAGNOSIS — L81.8 DECORATIVE TATTOO: ICD-10-CM

## 2021-01-13 DIAGNOSIS — Z34.02 ENCOUNTER FOR SUPERVISION OF NORMAL FIRST PREGNANCY IN SECOND TRIMESTER: Primary | ICD-10-CM

## 2021-01-13 RX ORDER — PRENATAL VIT/IRON FUM/FOLIC AC 27MG-0.8MG
1 TABLET ORAL DAILY
COMMUNITY

## 2021-01-13 NOTE — PROGRESS NOTES
Pt here today for RN [facility] Education. Missed menses apt with: DELFINA  LMP: 2020    Pre  BMI: 25.39  EPDS score:   Offered patient to send information to behavioral health navigator. Patient declined.  Advised resources available to patient lia

## 2021-01-18 ENCOUNTER — LAB ENCOUNTER (OUTPATIENT)
Dept: LAB | Facility: REFERENCE LAB | Age: 31
End: 2021-01-18
Attending: OBSTETRICS & GYNECOLOGY
Payer: COMMERCIAL

## 2021-01-18 DIAGNOSIS — L81.8 DECORATIVE TATTOO: ICD-10-CM

## 2021-01-18 DIAGNOSIS — Z34.02 ENCOUNTER FOR SUPERVISION OF NORMAL FIRST PREGNANCY IN SECOND TRIMESTER: ICD-10-CM

## 2021-01-18 LAB
ANTIBODY SCREEN: NEGATIVE
BASOPHILS # BLD AUTO: 0.04 X10(3) UL (ref 0–0.2)
BASOPHILS NFR BLD AUTO: 0.4 %
BILIRUB UR QL: NEGATIVE
COLOR UR: YELLOW
DEPRECATED RDW RBC AUTO: 46.3 FL (ref 35.1–46.3)
EOSINOPHIL # BLD AUTO: 0.11 X10(3) UL (ref 0–0.7)
EOSINOPHIL NFR BLD AUTO: 1.1 %
ERYTHROCYTE [DISTWIDTH] IN BLOOD BY AUTOMATED COUNT: 15 % (ref 11–15)
GLUCOSE UR-MCNC: NEGATIVE MG/DL
HBV SURFACE AG SER-ACNC: 0.44 [IU]/L
HBV SURFACE AG SERPL QL IA: NONREACTIVE
HCT VFR BLD AUTO: 32.3 %
HCV AB SERPL QL IA: NONREACTIVE
HGB BLD-MCNC: 10.6 G/DL
HGB UR QL STRIP.AUTO: NEGATIVE
IMM GRANULOCYTES # BLD AUTO: 0.15 X10(3) UL (ref 0–1)
IMM GRANULOCYTES NFR BLD: 1.5 %
KETONES UR-MCNC: NEGATIVE MG/DL
LEUKOCYTE ESTERASE UR QL STRIP.AUTO: NEGATIVE
LYMPHOCYTES # BLD AUTO: 2.31 X10(3) UL (ref 1–4)
LYMPHOCYTES NFR BLD AUTO: 22.9 %
MCH RBC QN AUTO: 27.7 PG (ref 26–34)
MCHC RBC AUTO-ENTMCNC: 32.8 G/DL (ref 31–37)
MCV RBC AUTO: 84.6 FL
MONOCYTES # BLD AUTO: 1 X10(3) UL (ref 0.1–1)
MONOCYTES NFR BLD AUTO: 9.9 %
NEUTROPHILS # BLD AUTO: 6.47 X10 (3) UL (ref 1.5–7.7)
NEUTROPHILS # BLD AUTO: 6.47 X10(3) UL (ref 1.5–7.7)
NEUTROPHILS NFR BLD AUTO: 64.2 %
NITRITE UR QL STRIP.AUTO: NEGATIVE
PH UR: 8 [PH] (ref 5–8)
PLATELET # BLD AUTO: 258 10(3)UL (ref 150–450)
PROT UR-MCNC: NEGATIVE MG/DL
RBC # BLD AUTO: 3.82 X10(6)UL
RH BLOOD TYPE: POSITIVE
RUBV IGG SER QL: POSITIVE
RUBV IGG SER-ACNC: 132.7 IU/ML (ref 10–?)
SP GR UR STRIP: 1.01 (ref 1–1.03)
UROBILINOGEN UR STRIP-ACNC: <2
WBC # BLD AUTO: 10.1 X10(3) UL (ref 4–11)

## 2021-01-18 PROCEDURE — 86780 TREPONEMA PALLIDUM: CPT

## 2021-01-18 PROCEDURE — 87340 HEPATITIS B SURFACE AG IA: CPT

## 2021-01-18 PROCEDURE — 87086 URINE CULTURE/COLONY COUNT: CPT

## 2021-01-18 PROCEDURE — 86850 RBC ANTIBODY SCREEN: CPT

## 2021-01-18 PROCEDURE — 86901 BLOOD TYPING SEROLOGIC RH(D): CPT

## 2021-01-18 PROCEDURE — 85025 COMPLETE CBC W/AUTO DIFF WBC: CPT

## 2021-01-18 PROCEDURE — 36415 COLL VENOUS BLD VENIPUNCTURE: CPT

## 2021-01-18 PROCEDURE — 86803 HEPATITIS C AB TEST: CPT

## 2021-01-18 PROCEDURE — 81003 URINALYSIS AUTO W/O SCOPE: CPT

## 2021-01-18 PROCEDURE — 86900 BLOOD TYPING SEROLOGIC ABO: CPT

## 2021-01-18 PROCEDURE — 86762 RUBELLA ANTIBODY: CPT

## 2021-01-18 PROCEDURE — 87389 HIV-1 AG W/HIV-1&-2 AB AG IA: CPT

## 2021-01-20 ENCOUNTER — ROUTINE PRENATAL (OUTPATIENT)
Dept: OBGYN CLINIC | Facility: CLINIC | Age: 31
End: 2021-01-20
Payer: COMMERCIAL

## 2021-01-20 VITALS — SYSTOLIC BLOOD PRESSURE: 120 MMHG | WEIGHT: 181 LBS | DIASTOLIC BLOOD PRESSURE: 70 MMHG | BODY MASS INDEX: 31 KG/M2

## 2021-01-20 DIAGNOSIS — Z34.82 ENCOUNTER FOR SUPERVISION OF OTHER NORMAL PREGNANCY IN SECOND TRIMESTER: Primary | ICD-10-CM

## 2021-01-20 LAB
APPEARANCE: CLEAR
MULTISTIX LOT#: 1044 NUMERIC
PH, URINE: 6 (ref 4.5–8)
SPECIFIC GRAVITY: 1.03 (ref 1–1.03)
T PALLIDUM AB SER QL: NEGATIVE
URINE-COLOR: YELLOW
UROBILINOGEN,SEMI-QN: 0.2 MG/DL (ref 0–1.9)

## 2021-01-20 PROCEDURE — 90686 IIV4 VACC NO PRSV 0.5 ML IM: CPT | Performed by: OBSTETRICS & GYNECOLOGY

## 2021-01-20 PROCEDURE — 81002 URINALYSIS NONAUTO W/O SCOPE: CPT | Performed by: OBSTETRICS & GYNECOLOGY

## 2021-01-20 PROCEDURE — 90471 IMMUNIZATION ADMIN: CPT | Performed by: OBSTETRICS & GYNECOLOGY

## 2021-01-20 PROCEDURE — 3074F SYST BP LT 130 MM HG: CPT | Performed by: OBSTETRICS & GYNECOLOGY

## 2021-01-20 PROCEDURE — 3078F DIAST BP <80 MM HG: CPT | Performed by: OBSTETRICS & GYNECOLOGY

## 2021-01-20 NOTE — PROGRESS NOTES
Pap Done. GC/Chlamydia Culture Done. To schedule OB ultrasound x 1 week. Desires Flu vaccine today. To take Iron daily in addition to PNV.

## 2021-01-21 ENCOUNTER — TELEPHONE (OUTPATIENT)
Dept: PERINATAL CARE | Facility: HOSPITAL | Age: 31
End: 2021-01-21

## 2021-01-21 ENCOUNTER — PATIENT MESSAGE (OUTPATIENT)
Dept: OBGYN CLINIC | Facility: CLINIC | Age: 31
End: 2021-01-21

## 2021-01-21 DIAGNOSIS — Z34.02 ENCOUNTER FOR SUPERVISION OF NORMAL FIRST PREGNANCY IN SECOND TRIMESTER: Primary | ICD-10-CM

## 2021-01-21 NOTE — TELEPHONE ENCOUNTER
Patient name and  verified. Level 1 ultrasound ordered and patient has ultrasound appointment scheduled. Offered patient pride line number regarding experience with MFM but declined. No further questions.

## 2021-01-21 NOTE — TELEPHONE ENCOUNTER
I tried to call the patient but did not get an answer. I wanted to apologize for her experience with the [facility] office. Please help her schedule a Level 1 ultrasound with the ultrasound department at [place] as she requests.   Please ask her if she needs hel

## 2021-01-21 NOTE — TELEPHONE ENCOUNTER
Patient is anxious to get 2nd ultrasound. Is already at [facility] 1-day. Please let her know when referral is in the system, so she can call and schedule.

## 2021-01-21 NOTE — TELEPHONE ENCOUNTER
RETURNED PATIENT CALL FOR APPOINTMENT, FIRST AVAILABLE OFFERED 2/9/21. PATIENT CAN NOT ATTEND ANY APPOINTMENT DURING OUR OFFICE HOURS. STATES WE ARE \"RIDICULOUS\". PATIENT TO CALL OB OFFICE FOR AN ORDER TO RADIOLOGY FOR HER ROUTINE LEVEL 1 US.

## 2021-01-26 ENCOUNTER — HOSPITAL ENCOUNTER (OUTPATIENT)
Dept: ULTRASOUND IMAGING | Age: 31
Discharge: HOME | End: 2021-01-26
Attending: OBSTETRICS & GYNECOLOGY
Payer: COMMERCIAL

## 2021-01-26 DIAGNOSIS — Z34.02 ENCOUNTER FOR SUPERVISION OF NORMAL FIRST PREGNANCY IN SECOND TRIMESTER: ICD-10-CM

## 2021-01-26 LAB
C TRACH DNA SPEC QL NAA+PROBE: NEGATIVE
N GONORRHOEA DNA SPEC QL NAA+PROBE: NEGATIVE

## 2021-01-26 PROCEDURE — 76805 OB US >/= 14 WKS SNGL FETUS: CPT | Performed by: OBSTETRICS & GYNECOLOGY

## 2021-01-27 LAB — HPV I/H RISK 1 DNA SPEC QL NAA+PROBE: NEGATIVE

## 2021-02-15 ENCOUNTER — LAB ENCOUNTER (OUTPATIENT)
Dept: LAB | Age: 31
End: 2021-02-15
Attending: OBSTETRICS & GYNECOLOGY
Payer: COMMERCIAL

## 2021-02-15 ENCOUNTER — TELEPHONE (OUTPATIENT)
Dept: OBGYN CLINIC | Facility: CLINIC | Age: 31
End: 2021-02-15

## 2021-02-15 DIAGNOSIS — R30.0 DYSURIA: ICD-10-CM

## 2021-02-15 DIAGNOSIS — R30.0 DYSURIA: Primary | ICD-10-CM

## 2021-02-15 LAB
BILIRUB UR QL: NEGATIVE
CLARITY UR: CLEAR
COLOR UR: YELLOW
GLUCOSE UR-MCNC: 50 MG/DL
HGB UR QL STRIP.AUTO: NEGATIVE
KETONES UR-MCNC: NEGATIVE MG/DL
LEUKOCYTE ESTERASE UR QL STRIP.AUTO: NEGATIVE
NITRITE UR QL STRIP.AUTO: NEGATIVE
PH UR: 6 [PH] (ref 5–8)
PROT UR-MCNC: NEGATIVE MG/DL
SP GR UR STRIP: 1.02 (ref 1–1.03)
UROBILINOGEN UR STRIP-ACNC: <2

## 2021-02-15 PROCEDURE — 81003 URINALYSIS AUTO W/O SCOPE: CPT

## 2021-02-15 PROCEDURE — 87086 URINE CULTURE/COLONY COUNT: CPT

## 2021-02-15 RX ORDER — NITROFURANTOIN 25; 75 MG/1; MG/1
100 CAPSULE ORAL 2 TIMES DAILY
Qty: 10 CAPSULE | Refills: 0 | Status: SHIPPED | OUTPATIENT
Start: 2021-02-15 | End: 2021-02-20

## 2021-02-15 NOTE — TELEPHONE ENCOUNTER
Patient is having discomfort going to the bathroom and didn't sleep well due to pain. Please advise.

## 2021-02-15 NOTE — TELEPHONE ENCOUNTER
PT DAILY TREATMENT NOTE    Patient Name: Raman Alas  Date:2020  : 1980  [x]  Patient  Verified  Payor: BLUE CROSS MEDICAID / Plan: [place] Yanez Luana / Product Type: Managed Care Medicaid /    In time:0800  Out [id number]  Total Treatment Time (min): 45  Total Timed Codes (min): 45   1:1 Treatment Time ([address] only): 39  Visit #: 14 of 16    Treatment Area: Neck pain [M54.2]    SUBJECTIVE  Pain Level (0-10 scale): 4 in neck  Any medication changes, allergies to medications, adverse drug reactions, diagnosis change, or new procedure performed?: [x] No    [] Yes (see summary sheet for update)  Subjective functional status/changes:   [] No changes reported  Patient reports that she is compliant with HEP. She states that she has learned to manage her symptoms but continues to have pain with ADLs. OBJECTIVE    45 min Therapeutic Exercise:  [x] See flow sheet :   Rationale: increase ROM, increase strength and decrease pain to improve the patients ability to complete ADLs         With   [x] TE   [] TA   [] neuro   [] other: Patient Education: [x] Review HEP    [] Progressed/Changed HEP based on:   [] positioning   [] body mechanics   [] transfers   [] heat/ice application    [] other:      Other Objective/Functional Measures:   FOTO 52  Cervical ROM WNLs  MMT 5/5  10# OH press    Pain Level (0-10 scale) post treatment: 2    ASSESSMENT/Changes in Function: Patient responds well to treatment session. No adverse effects were noted from today's treatment session. Patient is being discharged as she has met max medical benefit from physical therapy during the current episode of care. She has gained strength and functional mobility and has developed skills to manage the severity of her condition. She met 2/2 STGS and 2/4 LTGs. She has become independent with HEP activities and plans to continue to participate in an independent exercise program to reduce future episodes of care.  Provided HEP Pt voices for the past 2 days she has been having frequency and dysuria. Pt denies hematuria or fever. Symptoms have worsened over the past 2 days and pt unable to sleep last night. UTI protocol initiated. Macrobid 1 tab po bid x 5 days ordered.  Advised pt handout to promote seamless transition to independent fitness. []  See Plan of Care  []  See progress note/recertification  [x]  See Discharge Summary         Progress towards goals / Updated goals:  Short Term Goals: To be accomplished in 4 weeks:                   ANA will report compliance with HEP 1x/day to aid in rehabilitation program.                   Status at Ie: Patient to be instructed in and provided written copy of initial Home Exercise Program at first treatment session.                   Current:  Met 11/13/2020                      Patient will increase cervical ROM to WNL in all planes to aid in completion of ADLs.                  Status at IE:  Cervical AROM Comments:pain, area   Forward flexion 42     Extension 51     SB right 25  Increased right cervical pain   SB left  43     Rotation right 44 Increased right cervical pain   Rotation left 23                      Current:  Met cervical ROM with WNLs she continues to have pain with extension and right rotation 11/13/2020      Long Term Goals: To be accomplished in 8 weeks:                   Patient will increase cervical strength to 5/5 MMT throughout to aid in completion of ADLs.                  Status at IE:  Cervical Strength Comments:pain, area   Forward flexion 4+     Extension 4     SB right 3+  Increased right cervical pain   SB left  4     Rotation right 3+ Increased right cervical pain   Rotation left 4                      Current: Met 5/5 11/13/2020                      Patient will report pain no greater than 1-2/10 throughout entire day to aid in completion of ADLs.                  Status at [address] [place] [address] 6-10/10                   Current:  Not met patient is 3-4/10 with ADLs but has learned to manage her symptoms.                     Patent will be able to lift  10 lbs overhead without pain to be able to return to full work duties.                    Status at IE: 0 lbs                   Current:  Met 10 # overhead with no limitations. 11/13/2020                      Patient will improve FOTO (an established functional score where 100 = no disability) score to 60 points to demonstrate improvement in functional status.                  Status at IE: 50                   Current:  Not met 52, 11/13/2020    PLAN  []  Upgrade activities as tolerated     []  Continue plan of care  []  Update interventions per flow sheet       [x]  Discharge due to: Maximum therapeutic benefit from physical therapy during the current episode of care.    []  Other:_      Thien Jones PT, DPT 11/13/2020  7:55 AM    Future Appointments   Date Time Provider Ivan Nails   11/13/2020  8:00 AM Ana Paula Holden PT [facility]

## 2021-02-16 ENCOUNTER — ROUTINE PRENATAL (OUTPATIENT)
Dept: OBGYN CLINIC | Facility: CLINIC | Age: 31
End: 2021-02-16
Payer: COMMERCIAL

## 2021-02-16 VITALS
DIASTOLIC BLOOD PRESSURE: 87 MMHG | BODY MASS INDEX: 33 KG/M2 | WEIGHT: 194 LBS | HEART RATE: 87 BPM | SYSTOLIC BLOOD PRESSURE: 136 MMHG

## 2021-02-16 DIAGNOSIS — Z34.92 NORMAL PREGNANCY IN SECOND TRIMESTER: Primary | ICD-10-CM

## 2021-02-16 PROBLEM — O99.019 ANTEPARTUM ANEMIA: Status: ACTIVE | Noted: 2021-02-16

## 2021-02-16 PROBLEM — O99.019 ANTEPARTUM ANEMIA (HCC): Status: ACTIVE | Noted: 2021-02-16

## 2021-02-16 LAB
MULTISTIX LOT#: 1044 NUMERIC
PH, URINE: 7.5 (ref 4.5–8)
SPECIFIC GRAVITY: 1.01 (ref 1–1.03)
UROBILINOGEN,SEMI-QN: 0.2 MG/DL (ref 0–1.9)

## 2021-02-16 PROCEDURE — 3079F DIAST BP 80-89 MM HG: CPT | Performed by: OBSTETRICS & GYNECOLOGY

## 2021-02-16 PROCEDURE — 81002 URINALYSIS NONAUTO W/O SCOPE: CPT | Performed by: OBSTETRICS & GYNECOLOGY

## 2021-02-16 PROCEDURE — 3075F SYST BP GE 130 - 139MM HG: CPT | Performed by: OBSTETRICS & GYNECOLOGY

## 2021-02-16 RX ORDER — FERROUS SULFATE 325(65) MG
325 TABLET ORAL
Qty: 30 TABLET | Refills: 3 | Status: SHIPPED | OUTPATIENT
Start: 2021-02-16

## 2021-02-17 NOTE — PROGRESS NOTES
[facility], [facility]  Obstetrics and Gynecology  Prenatal Visit  Lashonda Harrington MD    MEJIA Murray is a 32year old.o.  22w6d weeks. Patient without complaints. She is feeling some fetal movement.   She denies any significant cramping, cont

## 2021-03-16 ENCOUNTER — TELEPHONE (OUTPATIENT)
Dept: OBGYN CLINIC | Facility: CLINIC | Age: 31
End: 2021-03-16

## 2021-03-16 ENCOUNTER — ROUTINE PRENATAL (OUTPATIENT)
Dept: OBGYN CLINIC | Facility: CLINIC | Age: 31
End: 2021-03-16
Payer: COMMERCIAL

## 2021-03-16 VITALS — BODY MASS INDEX: 35 KG/M2 | WEIGHT: 205 LBS | SYSTOLIC BLOOD PRESSURE: 120 MMHG | DIASTOLIC BLOOD PRESSURE: 90 MMHG

## 2021-03-16 DIAGNOSIS — Z34.92 NORMAL PREGNANCY IN SECOND TRIMESTER: Primary | ICD-10-CM

## 2021-03-16 LAB
MULTISTIX LOT#: 5077 NUMERIC
PH, URINE: 7 (ref 4.5–8)
SPECIFIC GRAVITY: 1.02 (ref 1–1.03)
URINE-COLOR: YELLOW
UROBILINOGEN,SEMI-QN: 0.2 MG/DL (ref 0–1.9)

## 2021-03-16 PROCEDURE — 3080F DIAST BP >= 90 MM HG: CPT | Performed by: OBSTETRICS & GYNECOLOGY

## 2021-03-16 PROCEDURE — 81002 URINALYSIS NONAUTO W/O SCOPE: CPT | Performed by: OBSTETRICS & GYNECOLOGY

## 2021-03-16 PROCEDURE — 3074F SYST BP LT 130 MM HG: CPT | Performed by: OBSTETRICS & GYNECOLOGY

## 2021-03-17 NOTE — TELEPHONE ENCOUNTER
Staff,    Prenatal pt had a diastolic of 90,  I do not see her repeat bp documented,   Please see to this. I have called the pt as well, and I am waiting for her call back.

## 2021-03-17 NOTE — TELEPHONE ENCOUNTER
Noted,  please followup and call pt by end of day tonight if she has not called back with her bp reading.

## 2021-03-17 NOTE — PROGRESS NOTES
Pt feels well. Good fm. Pregnancy counseling. Noted diastolic of 90 after pt left office,  Pt called and left message. Await call back.

## 2021-03-18 ENCOUNTER — HOSPITAL ENCOUNTER (OUTPATIENT)
Facility: HOSPITAL | Age: 31
Setting detail: OBSERVATION
Discharge: HOME | End: 2021-03-18
Attending: ADVANCED PRACTICE MIDWIFE | Admitting: OBSTETRICS & GYNECOLOGY
Payer: COMMERCIAL

## 2021-03-18 VITALS
DIASTOLIC BLOOD PRESSURE: 87 MMHG | SYSTOLIC BLOOD PRESSURE: 141 MMHG | TEMPERATURE: 98 F | RESPIRATION RATE: 16 BRPM | HEART RATE: 87 BPM

## 2021-03-18 PROBLEM — Z34.90 PREGNANCY (HCC): Status: ACTIVE | Noted: 2021-03-18

## 2021-03-18 PROBLEM — Z34.90 PREGNANCY: Status: ACTIVE | Noted: 2021-03-18

## 2021-03-18 LAB
ALBUMIN SERPL-MCNC: 3.2 G/DL (ref 3.4–5)
ALBUMIN/GLOB SERPL: 0.8 {RATIO} (ref 1–2)
ALP LIVER SERPL-CCNC: 78 U/L
ALT SERPL-CCNC: 22 U/L
ANION GAP SERPL CALC-SCNC: 8 MMOL/L (ref 0–18)
AST SERPL-CCNC: 11 U/L (ref 15–37)
BASOPHILS # BLD AUTO: 0.05 X10(3) UL (ref 0–0.2)
BASOPHILS NFR BLD AUTO: 0.5 %
BILIRUB SERPL-MCNC: 0.2 MG/DL (ref 0.1–2)
BUN BLD-MCNC: 6 MG/DL (ref 7–18)
BUN/CREAT SERPL: 11.3 (ref 10–20)
CALCIUM BLD-MCNC: 9 MG/DL (ref 8.5–10.1)
CHLORIDE SERPL-SCNC: 106 MMOL/L (ref 98–112)
CO2 SERPL-SCNC: 22 MMOL/L (ref 21–32)
CREAT BLD-MCNC: 0.53 MG/DL
CREAT UR-SCNC: 56.5 MG/DL
DEPRECATED RDW RBC AUTO: 41.9 FL (ref 35.1–46.3)
EOSINOPHIL # BLD AUTO: 0.09 X10(3) UL (ref 0–0.7)
EOSINOPHIL NFR BLD AUTO: 0.9 %
ERYTHROCYTE [DISTWIDTH] IN BLOOD BY AUTOMATED COUNT: 13.3 % (ref 11–15)
GLOBULIN PLAS-MCNC: 3.8 G/DL (ref 2.8–4.4)
GLUCOSE BLD-MCNC: 76 MG/DL (ref 70–99)
HCT VFR BLD AUTO: 34.7 %
HGB BLD-MCNC: 11.1 G/DL
IMM GRANULOCYTES # BLD AUTO: 0.18 X10(3) UL (ref 0–1)
IMM GRANULOCYTES NFR BLD: 1.8 %
LYMPHOCYTES # BLD AUTO: 2.13 X10(3) UL (ref 1–4)
LYMPHOCYTES NFR BLD AUTO: 21 %
M PROTEIN MFR SERPL ELPH: 7 G/DL (ref 6.4–8.2)
MCH RBC QN AUTO: 27.5 PG (ref 26–34)
MCHC RBC AUTO-ENTMCNC: 32 G/DL (ref 31–37)
MCV RBC AUTO: 86.1 FL
MONOCYTES # BLD AUTO: 0.98 X10(3) UL (ref 0.1–1)
MONOCYTES NFR BLD AUTO: 9.7 %
NEUTROPHILS # BLD AUTO: 6.69 X10 (3) UL (ref 1.5–7.7)
NEUTROPHILS # BLD AUTO: 6.69 X10(3) UL (ref 1.5–7.7)
NEUTROPHILS NFR BLD AUTO: 66.1 %
OSMOLALITY SERPL CALC.SUM OF ELEC: 278 MOSM/KG (ref 275–295)
PLATELET # BLD AUTO: 254 10(3)UL (ref 150–450)
POTASSIUM SERPL-SCNC: 3.5 MMOL/L (ref 3.5–5.1)
PROT UR-MCNC: 10.6 MG/DL
PROT/CREAT UR-RTO: 0.19
RBC # BLD AUTO: 4.03 X10(6)UL
SODIUM SERPL-SCNC: 136 MMOL/L (ref 136–145)
WBC # BLD AUTO: 10.1 X10(3) UL (ref 4–11)

## 2021-03-18 PROCEDURE — 99219 INITIAL OBSERVATION CARE,LEVL II: CPT | Performed by: OBSTETRICS & GYNECOLOGY

## 2021-03-18 PROCEDURE — 59025 FETAL NON-STRESS TEST: CPT | Performed by: OBSTETRICS & GYNECOLOGY

## 2021-03-18 NOTE — TELEPHONE ENCOUNTER
Noted that pt's bp at home 140/84, will need to have pt return today to triage,  serial bps and preeclampsia labs to be done in triage. Called pt and unable to leave message due to mailbox being full. Please try to call again.

## 2021-03-18 NOTE — PROGRESS NOTES
Pt is a 32year old female admitted to TR3/TR3-A. Patient presents with:  R/o Pih     Pt is [id number] 27w1d intra-uterine pregnancy. History obtained, consents signed. Oriented to room, staff, and plan of care.

## 2021-03-18 NOTE — TELEPHONE ENCOUNTER
Informed pt that Dr. Tayo Molina would like her to be seen at the [facility] for evaluation. Pt upset, crying. Emotional reassurance given. Pt voices she will go to the [facility] now. Report given to [facility] triage RN.

## 2021-03-29 ENCOUNTER — ROUTINE PRENATAL (OUTPATIENT)
Dept: OBGYN CLINIC | Facility: CLINIC | Age: 31
End: 2021-03-29
Payer: COMMERCIAL

## 2021-03-29 VITALS
WEIGHT: 214 LBS | DIASTOLIC BLOOD PRESSURE: 89 MMHG | SYSTOLIC BLOOD PRESSURE: 129 MMHG | BODY MASS INDEX: 37 KG/M2 | HEART RATE: 94 BPM

## 2021-03-29 DIAGNOSIS — F12.90 USES MARIJUANA: ICD-10-CM

## 2021-03-29 DIAGNOSIS — Z34.03 ENCOUNTER FOR SUPERVISION OF NORMAL FIRST PREGNANCY IN THIRD TRIMESTER: Primary | ICD-10-CM

## 2021-03-29 DIAGNOSIS — Z23 NEED FOR VACCINATION: ICD-10-CM

## 2021-03-29 LAB
APPEARANCE: CLEAR
MULTISTIX LOT#: 5077 NUMERIC
PH, URINE: 7.5 (ref 4.5–8)
SPECIFIC GRAVITY: 1.02 (ref 1–1.03)
URINE-COLOR: YELLOW
UROBILINOGEN,SEMI-QN: 0.2 MG/DL (ref 0–1.9)

## 2021-03-29 PROCEDURE — 90471 IMMUNIZATION ADMIN: CPT | Performed by: ADVANCED PRACTICE MIDWIFE

## 2021-03-29 PROCEDURE — 81002 URINALYSIS NONAUTO W/O SCOPE: CPT | Performed by: ADVANCED PRACTICE MIDWIFE

## 2021-03-29 PROCEDURE — 90715 TDAP VACCINE 7 YRS/> IM: CPT | Performed by: ADVANCED PRACTICE MIDWIFE

## 2021-03-29 PROCEDURE — 3079F DIAST BP 80-89 MM HG: CPT | Performed by: ADVANCED PRACTICE MIDWIFE

## 2021-03-29 PROCEDURE — 3074F SYST BP LT 130 MM HG: CPT | Performed by: ADVANCED PRACTICE MIDWIFE

## 2021-03-29 NOTE — PROGRESS NOTES
[facility], [facility]  Obstetrics and Gynecology  Prenatal Visit  Oscar Washburn CNM, APRN    HPI   Patria Locus is a 32year old.o.  28w5d weeks. Had an admission for elevated b/p last week.   Has been monitoring at home and has ranged from 117-1 is a 32year old female  with viable IUP at 28w5d weeks.   2021 GERARDO    (Z34.03) Encounter for supervision of normal first pregnancy in third trimester  (primary encounter diagnosis)    (Z23) Need for vaccination  Plan: TETANUS, [address]

## 2021-03-29 NOTE — PROGRESS NOTES
HPI/Subjective:   Patient ID: Branden Briggs is a 32year old female.     HPI    History/Other:   Review of Systems  Current Outpatient Medications   Medication Sig Dispense Refill   • Ferrous Sulfate 325 (65 Fe) MG Oral Tab Take 1 tablet (325 mg total)

## 2021-04-03 ENCOUNTER — LAB ENCOUNTER (OUTPATIENT)
Dept: LAB | Age: 31
End: 2021-04-03
Attending: ADVANCED PRACTICE MIDWIFE
Payer: COMMERCIAL

## 2021-04-03 DIAGNOSIS — Z34.03 ENCOUNTER FOR SUPERVISION OF NORMAL FIRST PREGNANCY IN THIRD TRIMESTER: ICD-10-CM

## 2021-04-03 PROCEDURE — 36415 COLL VENOUS BLD VENIPUNCTURE: CPT

## 2021-04-03 PROCEDURE — 82950 GLUCOSE TEST: CPT

## 2021-04-05 ENCOUNTER — TELEPHONE (OUTPATIENT)
Dept: OBGYN CLINIC | Facility: CLINIC | Age: 31
End: 2021-04-05

## 2021-04-05 DIAGNOSIS — R73.09 ELEVATED GLUCOSE TOLERANCE TEST: Primary | ICD-10-CM

## 2021-04-05 NOTE — TELEPHONE ENCOUNTER
VM full unable to leave . [email or web address] message sent to patient. 3 hour gtt ordered per SJM result note.

## 2021-04-05 NOTE — TELEPHONE ENCOUNTER
----- Message from EUSEBIO Mclain sent at 4/5/2021  8:39 AM CDT -----  Elevated 1 hour. Needs 3 hour GTT.   Please call patient and schedule test

## 2021-04-05 NOTE — TELEPHONE ENCOUNTER
Patient name and  verified. Patient informed of elevated 1 hour gtt and needs 3 hour gtt scheduled. Central scheduling number given to patient to schedule lab.

## 2021-04-15 ENCOUNTER — ROUTINE PRENATAL (OUTPATIENT)
Dept: OBGYN CLINIC | Facility: CLINIC | Age: 31
End: 2021-04-15
Payer: COMMERCIAL

## 2021-04-15 VITALS — SYSTOLIC BLOOD PRESSURE: 129 MMHG | DIASTOLIC BLOOD PRESSURE: 84 MMHG | BODY MASS INDEX: 37 KG/M2 | WEIGHT: 213 LBS

## 2021-04-15 DIAGNOSIS — Z34.03 ENCOUNTER FOR SUPERVISION OF NORMAL FIRST PREGNANCY IN THIRD TRIMESTER: Primary | ICD-10-CM

## 2021-04-15 DIAGNOSIS — F19.11 HISTORY OF DRUG ABUSE (HCC): ICD-10-CM

## 2021-04-15 PROCEDURE — 3079F DIAST BP 80-89 MM HG: CPT | Performed by: OBSTETRICS & GYNECOLOGY

## 2021-04-15 PROCEDURE — 81002 URINALYSIS NONAUTO W/O SCOPE: CPT | Performed by: OBSTETRICS & GYNECOLOGY

## 2021-04-15 PROCEDURE — 3074F SYST BP LT 130 MM HG: CPT | Performed by: OBSTETRICS & GYNECOLOGY

## 2021-04-21 ENCOUNTER — HOSPITAL ENCOUNTER (OUTPATIENT)
Age: 31
Discharge: HOME | End: 2021-04-21
Payer: COMMERCIAL

## 2021-04-21 VITALS
HEIGHT: 64 IN | TEMPERATURE: 98 F | DIASTOLIC BLOOD PRESSURE: 93 MMHG | OXYGEN SATURATION: 98 % | SYSTOLIC BLOOD PRESSURE: 137 MMHG | WEIGHT: 215 LBS | HEART RATE: 92 BPM | BODY MASS INDEX: 36.7 KG/M2 | RESPIRATION RATE: 18 BRPM

## 2021-04-21 DIAGNOSIS — Z20.822 LAB TEST NEGATIVE FOR COVID-19 VIRUS: ICD-10-CM

## 2021-04-21 DIAGNOSIS — J02.0 STREPTOCOCCAL SORE THROAT: Primary | ICD-10-CM

## 2021-04-21 PROCEDURE — 99213 OFFICE O/P EST LOW 20 MIN: CPT | Performed by: NURSE PRACTITIONER

## 2021-04-21 PROCEDURE — U0002 COVID-19 LAB TEST NON-CDC: HCPCS | Performed by: NURSE PRACTITIONER

## 2021-04-21 PROCEDURE — 87880 STREP A ASSAY W/OPTIC: CPT | Performed by: NURSE PRACTITIONER

## 2021-04-21 RX ORDER — AMOXICILLIN 875 MG/1
875 TABLET, COATED ORAL 2 TIMES DAILY
Qty: 20 TABLET | Refills: 0 | Status: SHIPPED | OUTPATIENT
Start: 2021-04-21 | End: 2021-05-01

## 2021-04-21 NOTE — ED INITIAL ASSESSMENT (HPI)
Pregnant [facility] [address] [facility].  Had baby shower recently and now sore throat for 2 days

## 2021-04-21 NOTE — ED PROVIDER NOTES
Patient presents with:  Sore Throat      HPI:     Patria Bosch is a 32year old female who presents for evaluation of a chief complaint of sore throat for the past couple days. No difficulty swallowing. Speech is clear.   No difficulty breathing or Cigarettes        Quit date: 10/12/2020        Years since quittin.5      Smokeless tobacco: Never Used      Tobacco comment: social smoker    Vaping Use      Vaping Use: Never used    Substance and Sexual Activity      Alcohol use: Not Currently Marital Status:   Intimate Partner Violence:       Fear of Current or Ex-Partner:       Emotionally Abused:       Physically Abused:       Sexually Abused:       ROS:   Positive for stated complaint sore throat  Other systems reviewed and are negative.   Co heart tones myself with the Doppler. Her fetal heart tones are 141. I will prescribe her amoxicillin for the strep throat. She is aware she can continue to drink plenty of fluids and take Tylenol as needed.   She will follow-up with her primary care doct

## 2021-04-21 NOTE — ED QUICK NOTES
meds as directed, rest, note for work provided. Call obgyn let them know your ill with strep.  Go to the ed for abd pain cramps new or worse concerns decreased activity level

## 2021-05-03 ENCOUNTER — HOSPITAL ENCOUNTER (OUTPATIENT)
Age: 31
Discharge: HOME | End: 2021-05-03
Payer: COMMERCIAL

## 2021-05-03 ENCOUNTER — HOSPITAL ENCOUNTER (OUTPATIENT)
Facility: HOSPITAL | Age: 31
Setting detail: OBSERVATION
Discharge: HOME | End: 2021-05-03
Attending: OBSTETRICS & GYNECOLOGY | Admitting: OBSTETRICS & GYNECOLOGY
Payer: COMMERCIAL

## 2021-05-03 VITALS
RESPIRATION RATE: 14 BRPM | SYSTOLIC BLOOD PRESSURE: 148 MMHG | DIASTOLIC BLOOD PRESSURE: 93 MMHG | TEMPERATURE: 98 F | HEART RATE: 82 BPM | BODY MASS INDEX: 36.65 KG/M2 | OXYGEN SATURATION: 99 % | WEIGHT: 220 LBS | HEIGHT: 65 IN

## 2021-05-03 VITALS
RESPIRATION RATE: 18 BRPM | HEART RATE: 79 BPM | DIASTOLIC BLOOD PRESSURE: 84 MMHG | SYSTOLIC BLOOD PRESSURE: 129 MMHG | TEMPERATURE: 99 F

## 2021-05-03 DIAGNOSIS — O16.9 ELEVATED BLOOD PRESSURE AFFECTING PREGNANCY, ANTEPARTUM: ICD-10-CM

## 2021-05-03 DIAGNOSIS — Z20.822 ENCOUNTER FOR LABORATORY TESTING FOR COVID-19 VIRUS: Primary | ICD-10-CM

## 2021-05-03 PROCEDURE — 81002 URINALYSIS NONAUTO W/O SCOPE: CPT | Performed by: NURSE PRACTITIONER

## 2021-05-03 PROCEDURE — 87880 STREP A ASSAY W/OPTIC: CPT | Performed by: NURSE PRACTITIONER

## 2021-05-03 PROCEDURE — U0002 COVID-19 LAB TEST NON-CDC: HCPCS | Performed by: NURSE PRACTITIONER

## 2021-05-03 PROCEDURE — 99213 OFFICE O/P EST LOW 20 MIN: CPT | Performed by: NURSE PRACTITIONER

## 2021-05-03 PROCEDURE — 59025 FETAL NON-STRESS TEST: CPT | Performed by: OBSTETRICS & GYNECOLOGY

## 2021-05-03 RX ORDER — AMOXICILLIN 875 MG/1
875 TABLET, COATED ORAL 2 TIMES DAILY
COMMUNITY
End: 2021-05-24

## 2021-05-04 ENCOUNTER — TELEPHONE (OUTPATIENT)
Dept: OBGYN CLINIC | Facility: CLINIC | Age: 31
End: 2021-05-04

## 2021-05-04 DIAGNOSIS — R03.0 ELEVATED BLOOD PRESSURE READING WITHOUT DIAGNOSIS OF HYPERTENSION: Primary | ICD-10-CM

## 2021-05-04 NOTE — ED INITIAL ASSESSMENT (HPI)
Pt has a sore throat. Pt recently positive for strep on 4/21/21. Pt states she is still taking amoxicillin. Pt is 34 weeks pregnant.

## 2021-05-04 NOTE — TRIAGE
[facility]      Triage Note    Warden So Patient Status:  Observation    1990 MRN [id number]   Location [facility] Attending Evita Haines MD   Hosp Day # 0 PCP MD Roc Kenny UROBILINOGEN <2.0 02/15/2021    LEUUR Negative 02/15/2021    UASA Negative 04/22/2019 05/03/21 2045 05/03/21  2100 05/03/21 2130 05/03/21 2145   BP: 130/88 (!) 130/91 126/83 129/84   BP Location: Left arm Left arm Left arm Left arm   Pulse: 86 82

## 2021-05-04 NOTE — PROGRESS NOTES
Pt is a 32year old female admitted to TR3/TR3-A. Patient presents with:  Elevated BP     Pt is  33w5d intra-uterine pregnancy. History obtained, consents signed. Oriented to room, staff, and plan of care.

## 2021-05-04 NOTE — ED PROVIDER NOTES
Patient Seen in: [facility]      History   Patient presents with:  Sore Throat    Stated Complaint: SORE THROAT    HPI/Subjective:   HPI    51-year-old female,  4 para 0-0-3-0 LMP 2020, 34 weeks pregnant, presents to the immediate HPI.  Constitutional and vital signs reviewed. All other systems reviewed and negative except as noted above.     Physical Exam     ED Triage Vitals [05/03/21 1908]   BP (!) 133/94   Pulse 82   Resp 14   Temp 97.9 °F (36.6 °C)   Temp src Temporal   SpO for laboratory testing for COVID-19 virus  (primary encounter diagnosis)  Elevated blood pressure affecting pregnancy, antepartum     Disposition:  Discharge  5/3/2021  7:54 pm    Follow-up:  No follow-up provider specified.         Medications Prescribed:

## 2021-05-04 NOTE — TELEPHONE ENCOUNTER
This patient was seen in [address] [place] [address] triage yesterday after having gone to urgent care. She had a diagnosis of strep throat and was not feeling well and had not been taking the amoxicillin that was prescribed.   She was found to have significantly elevated blood p

## 2021-05-04 NOTE — TELEPHONE ENCOUNTER
1. Pt scheduled to see asj on 5/6 at 4:20 pm.    2. Pt reminded to call the lab ASAP to schedule her 3 hour glucose. Pt verbalizes understanding. 3. Order placed to [facility] for weekly NST's. Advised pt she needs to call today to schedule.  Pt voices underst

## 2021-05-11 ENCOUNTER — ROUTINE PRENATAL (OUTPATIENT)
Dept: OBGYN CLINIC | Facility: CLINIC | Age: 31
End: 2021-05-11
Payer: COMMERCIAL

## 2021-05-11 ENCOUNTER — TELEPHONE (OUTPATIENT)
Dept: OBGYN CLINIC | Facility: CLINIC | Age: 31
End: 2021-05-11

## 2021-05-11 VITALS — SYSTOLIC BLOOD PRESSURE: 138 MMHG | WEIGHT: 230 LBS | DIASTOLIC BLOOD PRESSURE: 88 MMHG | BODY MASS INDEX: 38 KG/M2

## 2021-05-11 DIAGNOSIS — R73.09 ELEVATED GLUCOSE TOLERANCE TEST: ICD-10-CM

## 2021-05-11 DIAGNOSIS — O26.03 EXCESSIVE WEIGHT GAIN DURING PREGNANCY IN THIRD TRIMESTER: Primary | ICD-10-CM

## 2021-05-11 DIAGNOSIS — Z34.83 ENCOUNTER FOR SUPERVISION OF OTHER NORMAL PREGNANCY IN THIRD TRIMESTER: Primary | ICD-10-CM

## 2021-05-11 PROCEDURE — 3079F DIAST BP 80-89 MM HG: CPT | Performed by: OBSTETRICS & GYNECOLOGY

## 2021-05-11 PROCEDURE — 81003 URINALYSIS AUTO W/O SCOPE: CPT | Performed by: OBSTETRICS & GYNECOLOGY

## 2021-05-11 PROCEDURE — 3075F SYST BP GE 130 - 139MM HG: CPT | Performed by: OBSTETRICS & GYNECOLOGY

## 2021-05-12 ENCOUNTER — TELEPHONE (OUTPATIENT)
Dept: PERINATAL CARE | Facility: HOSPITAL | Age: 31
End: 2021-05-12

## 2021-05-12 NOTE — TELEPHONE ENCOUNTER
Please schedule patient for level II [facility] US ASAP for growth. Diagnosis is excess weight gain in pregnancy and elevated blood pressure in pregnancy. Pt also needs NST to begin w/in 1-2 days for excess weight gain in pregnancy and elevated blood pressures.

## 2021-05-12 NOTE — PROGRESS NOTES
[address] [facility]  Obstetrics and Gynecology   Prenatal Visit  Ester Berry MD    [facility]   Maria M Moyer is a 32year old.o.  34w6d weeks. Here for routine prenatal visit and is without complaints.   Patient denies any regular uterine contraction week for PN visit.   Farrah Fernandez MD, MD  7:03 PM  5/11/2021

## 2021-05-12 NOTE — TELEPHONE ENCOUNTER
I spoke with Dr. Werner Anderson, [facility], and he agreed to have this patient be seen by [facility] for an US. Please call [facility] again and scheduled US for as soon as possible.

## 2021-05-12 NOTE — TELEPHONE ENCOUNTER
Per staff message sent by [facility] RN, patient will not be able to keep her appointment scheduled on 5/13/21. Based on that conversation I cancelled her appointment.

## 2021-05-12 NOTE — TELEPHONE ENCOUNTER
Pt Name and  verified. Pt informed that [facility] has scheduled her tomorrow at 1pm for US. This RN spoke with pt in regards to apt and informed her. Per pt, she cannot make the 1pm US apt due to work.  Advised that J would like for her to complete this U

## 2021-05-13 ENCOUNTER — HOSPITAL ENCOUNTER (OUTPATIENT)
Dept: PERINATAL CARE | Facility: HOSPITAL | Age: 31
Discharge: HOME | End: 2021-05-13
Attending: OBSTETRICS & GYNECOLOGY
Payer: COMMERCIAL

## 2021-05-13 VITALS
DIASTOLIC BLOOD PRESSURE: 88 MMHG | WEIGHT: 230 LBS | HEART RATE: 88 BPM | SYSTOLIC BLOOD PRESSURE: 141 MMHG | BODY MASS INDEX: 38 KG/M2

## 2021-05-13 DIAGNOSIS — F12.20 CANNABIS USE DISORDER, SEVERE, DEPENDENCE (HCC): ICD-10-CM

## 2021-05-13 DIAGNOSIS — F14.10 COCAINE USE DISORDER (HCC): ICD-10-CM

## 2021-05-13 DIAGNOSIS — O16.9 ELEVATED BLOOD PRESSURE AFFECTING PREGNANCY, ANTEPARTUM: ICD-10-CM

## 2021-05-13 DIAGNOSIS — F31.9 BIPOLAR AFFECTIVE DISORDER, REMISSION STATUS UNSPECIFIED (HCC): ICD-10-CM

## 2021-05-13 DIAGNOSIS — O36.63X0 EXCESSIVE FETAL GROWTH AFFECTING MANAGEMENT OF PREGNANCY IN THIRD TRIMESTER, SINGLE OR UNSPECIFIED FETUS: Primary | ICD-10-CM

## 2021-05-13 DIAGNOSIS — O36.63X0 EXCESSIVE FETAL GROWTH AFFECTING MANAGEMENT OF PREGNANCY IN THIRD TRIMESTER, SINGLE OR UNSPECIFIED FETUS: ICD-10-CM

## 2021-05-13 PROCEDURE — 76816 OB US FOLLOW-UP PER FETUS: CPT | Performed by: OBSTETRICS & GYNECOLOGY

## 2021-05-13 PROCEDURE — 76819 FETAL BIOPHYS PROFIL W/O NST: CPT | Performed by: OBSTETRICS & GYNECOLOGY

## 2021-05-13 PROCEDURE — 99243 OFF/OP CNSLTJ NEW/EST LOW 30: CPT | Performed by: OBSTETRICS & GYNECOLOGY

## 2021-05-13 NOTE — PROGRESS NOTES
Pt for Growth US  Denies pregnancy complaints  States active fetus  Pt reminded to complete 3 hour gtt testing  States past OUD history was over 10 years ago denies use

## 2021-05-13 NOTE — PROGRESS NOTES
Outpatient Maternal-Fetal Medicine Consultation    Dear Dr. Olman Lehman,    Thank you for requesting ultrasound evaluation and maternal fetal medicine consultation on your patient Adair Yates.   As you are aware she is a 32year old female with a Singleto Infertility, female, Malignant hyperthermia, Osteoporosis, PID (pelvic inflammatory disease), PONV (postoperative nausea and vomiting), Pseudocholinesterase deficiency, Seizure disorder ([facility] Sexually transmitted disease, Sleep apnea, Syphilis, Thyroid di presentation. Placenta is posterior. A 3 vessel cord is noted. Cardiac activity is present at 140 bpm  EFW 2566 g ( 5 lb 11 oz); 43%. Polyhydramnios ALFA is  25.6 cm. MVP is 8.2 cm  BPP is 8/8.    Due to advanced GA age and maternal obesity ultraso Obstetricians and Gynecologists recommends delivery at 39+0 to 39+6 weeks, unless additional pregnancy complications warrant earlier delivery.  Delivery at a tertiary center is recommended for women with severe polyhydramnios since fetal anomalies may be pr discussed reasons for her to call her physician. We discussed the recommended plan of care based on her  risk factors. [facility] and her significant other, Jasbir, had their questions answered to their satisfaction.       IMPRESSION:  · IUP at 35w1d

## 2021-05-18 ENCOUNTER — LAB ENCOUNTER (OUTPATIENT)
Dept: LAB | Facility: HOSPITAL | Age: 31
End: 2021-05-18
Attending: OBSTETRICS & GYNECOLOGY
Payer: COMMERCIAL

## 2021-05-18 DIAGNOSIS — Z34.83 ENCOUNTER FOR SUPERVISION OF OTHER NORMAL PREGNANCY IN THIRD TRIMESTER: ICD-10-CM

## 2021-05-18 DIAGNOSIS — R73.09 ELEVATED GLUCOSE TOLERANCE TEST: ICD-10-CM

## 2021-05-18 PROCEDURE — 86780 TREPONEMA PALLIDUM: CPT

## 2021-05-18 PROCEDURE — 85027 COMPLETE CBC AUTOMATED: CPT

## 2021-05-18 PROCEDURE — 87389 HIV-1 AG W/HIV-1&-2 AB AG IA: CPT

## 2021-05-18 PROCEDURE — 36415 COLL VENOUS BLD VENIPUNCTURE: CPT

## 2021-05-18 PROCEDURE — 83036 HEMOGLOBIN GLYCOSYLATED A1C: CPT

## 2021-05-19 ENCOUNTER — TELEPHONE (OUTPATIENT)
Dept: OBGYN CLINIC | Facility: CLINIC | Age: 31
End: 2021-05-19

## 2021-05-19 DIAGNOSIS — R73.09 ELEVATED GLUCOSE TOLERANCE TEST: Primary | ICD-10-CM

## 2021-05-19 DIAGNOSIS — O24.419 GESTATIONAL DIABETES MELLITUS (GDM) IN THIRD TRIMESTER, GESTATIONAL DIABETES METHOD OF CONTROL UNSPECIFIED: ICD-10-CM

## 2021-05-19 NOTE — TELEPHONE ENCOUNTER
Pt called and informed of results and recommendations.  Pt voices understanding.    ----- Message from Mini Velasquez MD sent at 5/19/2021  2:24 PM CDT -----  Please notify patient of anemia on labs and to increase ferrous sulfate 325 mg to twice daily

## 2021-05-19 NOTE — TELEPHONE ENCOUNTER
05/19/2021 Pt was informed about her blood work results. Pt verbalized understanding.  Pt stated she will try to stop by the lab today or tomorrow to do her 3 hr glucose test.       Orders for 3 hr glucose test and [facility] referral were placed in Ep

## 2021-05-19 NOTE — TELEPHONE ENCOUNTER
----- Message from Kimberli Miranda MD sent at 5/19/2021  8:13 AM CDT -----  Please notify patient of an elevated hemoglobin A1c and the diagnosis of probable gestational diabetes.   Patient will need to go for diabetic education and learn to do 4 times da

## 2021-05-24 ENCOUNTER — APPOINTMENT (OUTPATIENT)
Dept: ULTRASOUND IMAGING | Facility: HOSPITAL | Age: 31
End: 2021-05-24
Attending: OBSTETRICS & GYNECOLOGY
Payer: COMMERCIAL

## 2021-05-24 ENCOUNTER — HOSPITAL ENCOUNTER (INPATIENT)
Facility: HOSPITAL | Age: 31
LOS: 4 days | Discharge: HOME | End: 2021-05-28
Attending: OBSTETRICS & GYNECOLOGY | Admitting: OBSTETRICS & GYNECOLOGY
Payer: COMMERCIAL

## 2021-05-24 ENCOUNTER — ROUTINE PRENATAL (OUTPATIENT)
Dept: OBGYN CLINIC | Facility: CLINIC | Age: 31
End: 2021-05-24
Payer: COMMERCIAL

## 2021-05-24 VITALS
HEART RATE: 96 BPM | SYSTOLIC BLOOD PRESSURE: 146 MMHG | DIASTOLIC BLOOD PRESSURE: 96 MMHG | BODY MASS INDEX: 39 KG/M2 | WEIGHT: 234 LBS

## 2021-05-24 DIAGNOSIS — Z34.03 ENCOUNTER FOR SUPERVISION OF NORMAL FIRST PREGNANCY IN THIRD TRIMESTER: Primary | ICD-10-CM

## 2021-05-24 PROCEDURE — 59025 FETAL NON-STRESS TEST: CPT | Performed by: OBSTETRICS & GYNECOLOGY

## 2021-05-24 PROCEDURE — 99221 1ST HOSP IP/OBS SF/LOW 40: CPT | Performed by: OBSTETRICS & GYNECOLOGY

## 2021-05-24 PROCEDURE — 76815 OB US LIMITED FETUS(S): CPT | Performed by: OBSTETRICS & GYNECOLOGY

## 2021-05-24 PROCEDURE — 3077F SYST BP >= 140 MM HG: CPT | Performed by: OBSTETRICS & GYNECOLOGY

## 2021-05-24 PROCEDURE — 81002 URINALYSIS NONAUTO W/O SCOPE: CPT | Performed by: OBSTETRICS & GYNECOLOGY

## 2021-05-24 PROCEDURE — 3E033VJ INTRODUCTION OF OTHER HORMONE INTO PERIPHERAL VEIN, PERCUTANEOUS APPROACH: ICD-10-PCS | Performed by: OBSTETRICS & GYNECOLOGY

## 2021-05-24 PROCEDURE — 3080F DIAST BP >= 90 MM HG: CPT | Performed by: OBSTETRICS & GYNECOLOGY

## 2021-05-24 PROCEDURE — 3E0P7VZ INTRODUCTION OF HORMONE INTO FEMALE REPRODUCTIVE, VIA NATURAL OR ARTIFICIAL OPENING: ICD-10-PCS | Performed by: OBSTETRICS & GYNECOLOGY

## 2021-05-24 PROCEDURE — 99223 1ST HOSP IP/OBS HIGH 75: CPT | Performed by: OBSTETRICS & GYNECOLOGY

## 2021-05-24 RX ORDER — LABETALOL HYDROCHLORIDE 5 MG/ML
80 INJECTION, SOLUTION INTRAVENOUS ONCE AS NEEDED
Status: DISCONTINUED | OUTPATIENT
Start: 2021-05-24 | End: 2021-05-27

## 2021-05-24 RX ORDER — ONDANSETRON 2 MG/ML
4 INJECTION INTRAMUSCULAR; INTRAVENOUS EVERY 6 HOURS PRN
Status: DISCONTINUED | OUTPATIENT
Start: 2021-05-24 | End: 2021-05-26

## 2021-05-24 RX ORDER — LABETALOL HYDROCHLORIDE 5 MG/ML
20 INJECTION, SOLUTION INTRAVENOUS ONCE AS NEEDED
Status: DISCONTINUED | OUTPATIENT
Start: 2021-05-24 | End: 2021-05-27

## 2021-05-24 RX ORDER — DEXTROSE, SODIUM CHLORIDE, SODIUM LACTATE, POTASSIUM CHLORIDE, AND CALCIUM CHLORIDE 5; .6; .31; .03; .02 G/100ML; G/100ML; G/100ML; G/100ML; G/100ML
INJECTION, SOLUTION INTRAVENOUS AS NEEDED
Status: DISCONTINUED | OUTPATIENT
Start: 2021-05-24 | End: 2021-05-27 | Stop reason: HOSPADM

## 2021-05-24 RX ORDER — AMMONIA INHALANTS 0.04 G/.3ML
0.3 INHALANT RESPIRATORY (INHALATION) AS NEEDED
Status: DISCONTINUED | OUTPATIENT
Start: 2021-05-24 | End: 2021-05-26

## 2021-05-24 RX ORDER — TERBUTALINE SULFATE 1 MG/ML
0.25 INJECTION, SOLUTION SUBCUTANEOUS AS NEEDED
Status: DISCONTINUED | OUTPATIENT
Start: 2021-05-24 | End: 2021-05-27 | Stop reason: HOSPADM

## 2021-05-24 RX ORDER — LIDOCAINE HYDROCHLORIDE 10 MG/ML
30 INJECTION, SOLUTION EPIDURAL; INFILTRATION; INTRACAUDAL; PERINEURAL ONCE
Status: DISCONTINUED | OUTPATIENT
Start: 2021-05-24 | End: 2021-05-27 | Stop reason: HOSPADM

## 2021-05-24 RX ORDER — HYDRALAZINE HYDROCHLORIDE 20 MG/ML
10 INJECTION INTRAMUSCULAR; INTRAVENOUS ONCE AS NEEDED
Status: DISCONTINUED | OUTPATIENT
Start: 2021-05-24 | End: 2021-05-27

## 2021-05-24 RX ORDER — TRISODIUM CITRATE DIHYDRATE AND CITRIC ACID MONOHYDRATE 500; 334 MG/5ML; MG/5ML
30 SOLUTION ORAL AS NEEDED
Status: DISCONTINUED | OUTPATIENT
Start: 2021-05-24 | End: 2021-05-27 | Stop reason: HOSPADM

## 2021-05-24 RX ORDER — LABETALOL HYDROCHLORIDE 5 MG/ML
40 INJECTION, SOLUTION INTRAVENOUS ONCE AS NEEDED
Status: DISCONTINUED | OUTPATIENT
Start: 2021-05-24 | End: 2021-05-27

## 2021-05-24 RX ORDER — SODIUM CHLORIDE, SODIUM LACTATE, POTASSIUM CHLORIDE, CALCIUM CHLORIDE 600; 310; 30; 20 MG/100ML; MG/100ML; MG/100ML; MG/100ML
INJECTION, SOLUTION INTRAVENOUS CONTINUOUS
Status: DISCONTINUED | OUTPATIENT
Start: 2021-05-24 | End: 2021-05-27 | Stop reason: HOSPADM

## 2021-05-24 RX ORDER — IBUPROFEN 600 MG/1
600 TABLET ORAL EVERY 6 HOURS PRN
Status: DISPENSED | OUTPATIENT
Start: 2021-05-24 | End: 2021-05-26

## 2021-05-24 RX ORDER — ACETAMINOPHEN 500 MG
500 TABLET ORAL EVERY 6 HOURS PRN
Status: DISCONTINUED | OUTPATIENT
Start: 2021-05-24 | End: 2021-05-26

## 2021-05-24 NOTE — PROGRESS NOTES
[facility], [facility]  Obstetrics and Gynecology  Prenatal Visit  Nicol Blount MD    HPI   Patria Bosch is a 32year old.o.  36w5d weeks. Here for routine prenatal visit and is without complaints.   Patient denies any regular uterine contraction 5/26/2021.   Na Graves MD, MD  5:53 PM  5/24/2021

## 2021-05-25 ENCOUNTER — APPOINTMENT (OUTPATIENT)
Dept: ENDOCRINOLOGY | Facility: HOSPITAL | Age: 31
End: 2021-05-25
Attending: OBSTETRICS & GYNECOLOGY
Payer: COMMERCIAL

## 2021-05-25 RX ORDER — DIPHENHYDRAMINE HCL 50 MG
50 CAPSULE ORAL ONCE
Status: COMPLETED | OUTPATIENT
Start: 2021-05-25 | End: 2021-05-25

## 2021-05-25 NOTE — H&P
Mercedes 60 Patient Status:  Observation    1990 MRN [id number]   Location [facility] Attending Shania Rosas MD   Hosp Day # 0 PCP Luz Torres MD • Hypertension Father    • No Known Problems Mother    • Heart Disorder Maternal Grandfather         CHF   • Hypertension Maternal Grandfather    • Stroke Maternal Grandfather    • Dementia Paternal Grandfather    • Anxiety Neg    • Depression Neg    • B 05/24/2021    BUN 7 05/24/2021     05/24/2021    K 3.8 05/24/2021     05/24/2021    CO2 21.0 05/24/2021    GLU 89 05/24/2021    CA 9.2 05/24/2021    ALB 2.6 (L) 05/24/2021    ALKPHO 167 (H) 05/24/2021    BILT 0.1 05/24/2021    TP 6.7 05/24/2021 days with EDC of 6/14/2021          Component   Ref Range & Units 5/24/21  9:35 PM   Total Protein Urine Random   mg/dL 17.7    Creatinine Ur Random   mg/dL 106.00    Urine Protein/Creatinine Ratio, Random  0.17    Comment: Per the [address] [place] [address] 0.10 - 1.00 x10(3) uL 0.88    Eosinophil Absolute   0.00 - 0.70 x10(3) uL 0.11    Basophil Absolute   0.00 - 0.20 x10(3) uL 0.04    Immature Granulocyte Absolute   0.00 - 1.00 x10(3) uL 0.19    Neutrophil %   % 63.9    Lymphocyte %   % 24.2    Monocyte %

## 2021-05-25 NOTE — PROGRESS NOTES
Pt is a 32year old female admitted to 375/375-A. Patient presents with:  R/o Pih: elevated bps in office, sent to triage for labs and nst and vitaliy      Pt is  36w5d intra-uterine pregnancy. History obtained, consents signed.  Oriented to room, sta

## 2021-05-25 NOTE — PROGRESS NOTES
HD 1    Pt feels mild cramps/contractions, no c/o headaches/visual changes/abd pain  Pt alert and oriented, nad  Fhts:  Moderate variability,  Positive accelerations, cat 1    IOL for gest htn/gest diabetes:  Pt was sent from office yesterday for nst/vitaliy d

## 2021-05-26 ENCOUNTER — ANESTHESIA EVENT (OUTPATIENT)
Dept: OBGYN UNIT | Facility: HOSPITAL | Age: 31
End: 2021-05-26
Payer: COMMERCIAL

## 2021-05-26 ENCOUNTER — ANESTHESIA (OUTPATIENT)
Dept: OBGYN UNIT | Facility: HOSPITAL | Age: 31
End: 2021-05-26
Payer: COMMERCIAL

## 2021-05-26 PROCEDURE — 59400 OBSTETRICAL CARE: CPT | Performed by: OBSTETRICS & GYNECOLOGY

## 2021-05-26 RX ORDER — SIMETHICONE 80 MG
80 TABLET,CHEWABLE ORAL 3 TIMES DAILY PRN
Status: DISCONTINUED | OUTPATIENT
Start: 2021-05-26 | End: 2021-05-28

## 2021-05-26 RX ORDER — BUPIVACAINE HCL/0.9 % NACL/PF 0.25 %
5 PLASTIC BAG, INJECTION (ML) EPIDURAL AS NEEDED
Status: DISCONTINUED | OUTPATIENT
Start: 2021-05-26 | End: 2021-05-27

## 2021-05-26 RX ORDER — ACETAMINOPHEN 325 MG/1
650 TABLET ORAL EVERY 6 HOURS PRN
Status: DISCONTINUED | OUTPATIENT
Start: 2021-05-26 | End: 2021-05-28

## 2021-05-26 RX ORDER — DOCUSATE SODIUM 100 MG/1
100 CAPSULE, LIQUID FILLED ORAL
Status: DISCONTINUED | OUTPATIENT
Start: 2021-05-27 | End: 2021-05-28

## 2021-05-26 RX ORDER — AMMONIA INHALANTS 0.04 G/.3ML
0.3 INHALANT RESPIRATORY (INHALATION) AS NEEDED
Status: DISCONTINUED | OUTPATIENT
Start: 2021-05-26 | End: 2021-05-28

## 2021-05-26 RX ORDER — BISACODYL 10 MG
10 SUPPOSITORY, RECTAL RECTAL ONCE AS NEEDED
Status: DISCONTINUED | OUTPATIENT
Start: 2021-05-26 | End: 2021-05-28

## 2021-05-26 RX ORDER — NALBUPHINE HCL 10 MG/ML
2.5 AMPUL (ML) INJECTION
Status: DISCONTINUED | OUTPATIENT
Start: 2021-05-26 | End: 2021-05-27

## 2021-05-26 RX ORDER — LIDOCAINE HYDROCHLORIDE 10 MG/ML
INJECTION, SOLUTION EPIDURAL; INFILTRATION; INTRACAUDAL; PERINEURAL AS NEEDED
Status: DISCONTINUED | OUTPATIENT
Start: 2021-05-26 | End: 2021-05-27 | Stop reason: SURG

## 2021-05-26 RX ORDER — BUPIVACAINE HYDROCHLORIDE 2.5 MG/ML
20 INJECTION, SOLUTION EPIDURAL; INFILTRATION; INTRACAUDAL ONCE
Status: DISCONTINUED | OUTPATIENT
Start: 2021-05-26 | End: 2021-05-27 | Stop reason: HOSPADM

## 2021-05-26 RX ORDER — DIAPER,BRIEF,INFANT-TODD,DISP
1 EACH MISCELLANEOUS EVERY 6 HOURS PRN
Status: DISCONTINUED | OUTPATIENT
Start: 2021-05-26 | End: 2021-05-28

## 2021-05-26 RX ORDER — LIDOCAINE HYDROCHLORIDE AND EPINEPHRINE 15; 5 MG/ML; UG/ML
INJECTION, SOLUTION EPIDURAL AS NEEDED
Status: DISCONTINUED | OUTPATIENT
Start: 2021-05-26 | End: 2021-05-27 | Stop reason: SURG

## 2021-05-26 RX ORDER — ONDANSETRON 2 MG/ML
4 INJECTION INTRAMUSCULAR; INTRAVENOUS EVERY 6 HOURS PRN
Status: DISCONTINUED | OUTPATIENT
Start: 2021-05-26 | End: 2021-05-28

## 2021-05-26 RX ADMIN — LIDOCAINE HYDROCHLORIDE AND EPINEPHRINE 5 ML: 15; 5 INJECTION, SOLUTION EPIDURAL at 08:55:00

## 2021-05-26 RX ADMIN — LIDOCAINE HYDROCHLORIDE 3 ML: 10 INJECTION, SOLUTION EPIDURAL; INFILTRATION; INTRACAUDAL; PERINEURAL at 08:52:00

## 2021-05-26 NOTE — PAYOR COMM NOTE
--------------  ADMISSION REVIEW     Payor: ADAIR PP  Subscriber #:  [id number]  Authorization Number: [id number]    Admit date: 5/24/21  Admit time: 11:08 PM       Admitting Physician: Kojo Larios MD  Attending Physician:  Kirt Mcginnis, Disorder Maternal Grandmother     • Stroke Paternal Grandmother           CVA   • Hypertension Paternal Grandmother     • Diabetes Paternal Grandmother     • Lipids Father     • Hypertension Father     • No Known Problems Mother     • Heart Disorder Matern TREPONEMALAB Negative 05/18/2021     HBVSAG Non-Reactive 08/30/2011     ABO O 01/18/2021     RH Positive 01/18/2021     WBC 10.3 05/24/2021     HGB 10.7 (L) 05/24/2021     HCT 33.7 (L) 05/24/2021     .0 05/24/2021     CREATSERUM 0.51 (L) 05/24/2021 obstetrical and fetal evaluation.       FINDINGS:   FETAL NUMBER: Single. POSITION cephalic   FETAL HEART RATE: 137 BPM     AMNIOTIC FLUID VOLUME: Normal ALFA of 17.36 cm with 4 pockets of fluid identified. PLACENTA LOCATION: Posterior without previa. [place] Master 37.0 g/dL 31.8    RDW-SD   35.1 - 46.3 fL 42.9    RDW   11.0 - 15.0 % 15.0    PLT   150.0 - 450.0 10(3)uL 296.0    Neutrophil Absolute Prelim   1.50 - 7.70 x10 (3) uL 6.57    Neutrophil Absolute   1.50 - 7.70 x10(3) uL 6.57    Lymphocyte Absolute   1.00 - answered. Electronically signed by Shania Rosas MD at 5/25/2021  8:02 AM    5/26  Assessment & Plan: []Collapse by Default       Pregnancy  IUP- 37 weeks. Presumed Gestational DM. Gestational Hypertension.   GBBS status pending. /  I   K 3.8 05/24/2021      05/24/2021     CO2 21.0 05/24/2021     GLU 89 05/24/2021     CA 9.2 05/24/2021     ALB 2.6 (L) 05/24/2021     ALKPHO 167 (H) 05/24/2021     BILT 0.1 05/24/2021     TP 6.7 05/24/2021     AST 14 (L) 05/24/2021     ALT 18 05/24 5/26/2021 0855 Given 5 mL Epidural Berenice Jauregui MD      oxyTOCIN (PITOCIN) 30 units/ 500 ml 0.9% NS premix infusion     Date Action Dose Route User    5/26/2021 1000 Rate/Dose Change 12 jorge-units/min Intravenous Anabel Bernardo RN    5/26/2021 8904

## 2021-05-26 NOTE — ANESTHESIA PROCEDURE NOTES
Labor Analgesia  Performed by: Milan Bautista MD  Authorized by: Milan Bautista MD       General Information and Staff    Start Time:  5/26/2021 8:50 AM  End Time:  5/26/2021 9:00 AM  Anesthesiologist:  Milan Bautista MD  Performed by:   Anesthesiologist

## 2021-05-26 NOTE — PROGRESS NOTES
[facility]    OB/GYNE Progress Note      Janelle Bias Patient Status:  Inpatient    1990 MRN [id number]   Location [address] Attending Marlon Ames MD   [facility] Day # 1 PCP Raimundo Gifford, ABO O 05/24/2021    RH Positive 05/24/2021    WBC 10.3 05/24/2021    HGB 10.7 (L) 05/24/2021    HCT 33.7 (L) 05/24/2021    .0 05/24/2021    CREATSERUM 0.51 (L) 05/24/2021    BUN 7 05/24/2021     05/24/2021    K 3.8 05/24/2021     05/2

## 2021-05-26 NOTE — PROGRESS NOTES
[place] [facility] - [facility]    OB/GYNE Progress Note      Kevin Cazares Patient Status:  Inpatient    1990 MRN [id number]   Location [address]  Attending Collin Fenton, Odette Roach MD   [address] Day # 2 PCP Mikala Galindo, 05/24/2021    BUN 7 05/24/2021     05/24/2021    K 3.8 05/24/2021     05/24/2021    CO2 21.0 05/24/2021    GLU 89 05/24/2021    CA 9.2 05/24/2021    ALB 2.6 (L) 05/24/2021    ALKPHO 167 (H) 05/24/2021    BILT 0.1 05/24/2021    TP 6.7 05/24/2021

## 2021-05-26 NOTE — ANESTHESIA PREPROCEDURE EVALUATION
Anesthesia PreOp Note    HPI:     Janelle Suero is a 32year old female who presents for preoperative consultation requested by: * No surgeons listed *    Date of Surgery: 5/26/2021    * No procedures listed *  Indication: * No pre-op diagnosis entere time  Prenatal 27-0.8 MG Oral Tab, Take 1 tablet by mouth daily. , Disp: , Rfl: , 5/24/2021 at Unknown time      penicillin G potassium 3 Million Units in sodium chloride 0.9% 100 mL IVPB, 3 Million Units, Intravenous, Q4H, Malcolm Castellanos MD  lactated r injection 50 mcg, 50 mcg, Intravenous, Q30 Min PRN, George Acosta MD  oxyTOCIN (PITOCIN) 30 units/ 500 ml 0.9% NS premix infusion, 0.5-20 jorge-units/min, Intravenous, Continuous, Santos Castillo MD, Last Rate: 8 mL/hr at 05/26/21 0616, 8 jorge-u comment: social smoker    Vaping Use      Vaping Use: Never used    Substance and Sexual Activity      Alcohol use: Not Currently        Alcohol/week: 0.0 standard drinks        Comment: socially.  last drink 9/15/19      Drug use: Not Currently        Type Abused:       Sexually Abused:     Available pre-op labs reviewed.   Lab Results   Component Value Date    WBC 10.3 05/24/2021    RBC 4.18 05/24/2021    HGB 10.7 (L) 05/24/2021    HCT 33.7 (L) 05/24/2021    MCV 80.6 05/24/2021    MCH 25.6 (L) 05/24/2021 damage if relevant, major complications, and any alternative forms of anesthetic management. All of the patient's questions were answered to the best of my ability. The patient desires the anesthetic management as planned.   Alyssa Murphy  5/26/2021 9:05

## 2021-05-27 RX ORDER — HYDROCODONE BITARTRATE AND ACETAMINOPHEN 5; 325 MG/1; MG/1
1 TABLET ORAL EVERY 6 HOURS PRN
Status: DISCONTINUED | OUTPATIENT
Start: 2021-05-27 | End: 2021-05-28

## 2021-05-27 RX ORDER — MELATONIN
325
Status: DISCONTINUED | OUTPATIENT
Start: 2021-05-27 | End: 2021-05-28

## 2021-05-27 RX ORDER — CHOLECALCIFEROL (VITAMIN D3) 25 MCG
1 TABLET,CHEWABLE ORAL DAILY
Status: DISCONTINUED | OUTPATIENT
Start: 2021-05-27 | End: 2021-05-28

## 2021-05-27 NOTE — PROGRESS NOTES
[facility]    OB/GYNE Progress Note      Mandi Parada Patient Status:  Inpatient    1990 MRN [id number]   Location [facility] 3SE Attending Dori Bangura MD   [facility] Day # 3 PCP MD Kathy Gray discharge tomorrow.   Social service      Melissa Perry MD  5/27/2021  7:46 AM

## 2021-05-27 NOTE — ANESTHESIA POSTPROCEDURE EVALUATION
Patient: Patria Bosch    Procedure Summary     Date: 05/26/21 Room / Location:     Anesthesia Start: 0848 Anesthesia Stop: 9803    Procedure: LABOR ANALGESIA Diagnosis:     Scheduled Providers:  Anesthesiologist: Milan Bautista MD    Anesthesia Type

## 2021-05-27 NOTE — PROGRESS NOTES
Received patient and infant into room 361 via [facility]. Bedside shift report received from [employer] Pt transferred to bed. Bed in lowest and locked position. Side rails up x2. VSS. IV site unremarkable.  Baby present in open crib.  ID bands matched. Dontae Ricci and

## 2021-05-27 NOTE — PROGRESS NOTES
[facility]    Labor Progress Note    Kevin Cazares Patient Status:  Inpatient    1990 MRN [id number]   Location [address] [facility] Attending Claude Sequin, MD   [address] Day # 2 PCP Mikala Galindo MD

## 2021-05-27 NOTE — CM/SW NOTE
MDO to DEJUAN for Hist drug use; emotional problems; anxiety    SW met with patient and OLIMPIA Martell bedside. SW confirmed face sheet contact as correct. Pt resides w/FOB and his dtr.     Baby boy/girl name: Flako Martell  Date & time of delivery: 5/26/21 @ 10:

## 2021-05-27 NOTE — PROGRESS NOTES
Patient up to bathroom with assist x 2. Voided 400ml. Patient transferred to mother/baby room 361 per wheelchair in stable condition with baby and personal belongings. Accompanied by significant other and staff. Report given to mother/baby RN.

## 2021-05-27 NOTE — L&D DELIVERY NOTE
Sharynagustín Capellan [[id number]]    Labor Events     labor?: No   steroids?: None  Cervical ripening type: Cervidil  Antibiotics received during labor?: Yes  Antibiotics (enter # doses in comment): penicillin  Rupture date/time: 2021 0541 assigned by: Nhi Mcpherson   disposition: with mother     Skin to Skin    Skin to skin initiated date/time: 2021 2304  Skin to skin with:  Mother     Vaginal Count    Initial count RN: Jose Condon RN  Initial count Tech: George Herbert

## 2021-05-27 NOTE — PAYOR COMM NOTE
--------------  CONTINUED STAY REVIEW    Payor: [facility] PPO  Subscriber #:  [id number]  Authorization Number: [id number]    Admit date: 5/24/21  Admit time: 11:08 PM    Admitting Physician: Mravin Jordan MD  Attending Physician:  Melanie Cornejo, irritability No response Grimace Cry or active withdrawal     Muscle tone Limp Some flexion Active motion     Respiratory effort Absent Weak cry; hypoventilation Good, crying                1 Minute:  5 Minute:  10 Minute:  15 Minute:  20 Minute:    Skin c epidural redosing. C/o pain muscle upper thigh     Neonatologist Present: no  Delivery Comment: baby cried upon delivery. After 60 sec delay, cord clamped, cut and baby to awaiting staff.        Intake/Output   EBL:  200 ml  QBL pending        Francis SHIELDS Natividad Cleveland RN    5/26/2021 1600 New Bag 3 Million Units Intravenous Karishma Bradshaw RN

## 2021-05-28 VITALS
OXYGEN SATURATION: 98 % | TEMPERATURE: 98 F | HEART RATE: 80 BPM | SYSTOLIC BLOOD PRESSURE: 138 MMHG | DIASTOLIC BLOOD PRESSURE: 82 MMHG | RESPIRATION RATE: 16 BRPM

## 2021-05-28 RX ORDER — HYDROCODONE BITARTRATE AND ACETAMINOPHEN 5; 325 MG/1; MG/1
1 TABLET ORAL EVERY 6 HOURS PRN
Qty: 15 TABLET | Refills: 0 | Status: SHIPPED | OUTPATIENT
Start: 2021-05-28

## 2021-05-28 NOTE — LACTATION NOTE
LACTATION NOTE - MOTHER      Evaluation Type: Inpatient    Problems identified  Problems identified: Knowledge deficit; Nipple pain; Nipple trauma; Unable to acheive sustained latch    Maternal history  Maternal history: Anxiety;Depression;PIH  Other/comment:

## 2021-05-28 NOTE — DISCHARGE PLANNING
Discharge Summary     Discharge order received from MD. All discharge paperwork and medications reviewed including education on signs and symptoms of Preeclampsia. OUD packet and given with education.

## 2021-05-28 NOTE — PAYOR COMM NOTE
--------------  DISCHARGE REVIEW    Payor: ADAIR CR  Subscriber #:  [id number]  Authorization Number: [id number]    Admit date: 5/24/21  Admit time:  11:08 PM  Discharge Date: 5/28/2021 10:30 AM     Admitting Physician: Sil Bueno MD  Attending mouth every 6 (six) hours as needed for Pain. Home Meds - Unchanged    Ferrous Sulfate 325 (65 Fe) MG Oral Tab  Take 1 tablet (325 mg total) by mouth daily with breakfast.    Prenatal 27-0.8 MG Oral Tab  Take 1 tablet by mouth daily.               Disc

## 2021-05-28 NOTE — LACTATION NOTE
This note was copied from a baby's chart. LACTATION NOTE - INFANT    Evaluation Type  Evaluation Type: Inpatient    Problems & Assessment  Problems Diagnosed or Identified: Latch difficulty; Shallow latch  Infant Assessment: Minimal hunger cues present; Ski

## 2021-05-28 NOTE — DISCHARGE SUMMARY
[place] [facility]    Discharge Summary    Martha Goddard Patient Status:  Inpatient    1990 MRN [id number]   Location [facility] 3SE Attending Juhi Coppola MD   [facility] Day # 4 PCP Waylon Goncalves MD     Date of Admiss Schedule an appointment as soon as possible for a visit in 6 weeks. Specialty: OBSTETRICS & GYNECOLOGY  Why: postpartum exam  Contact information:  [address]  [phone]                           Ad Cortez

## 2021-06-08 ENCOUNTER — TELEPHONE (OUTPATIENT)
Dept: OBGYN UNIT | Facility: HOSPITAL | Age: 31
End: 2021-06-08

## 2021-06-10 ENCOUNTER — TELEPHONE (OUTPATIENT)
Dept: OBGYN UNIT | Facility: HOSPITAL | Age: 31
End: 2021-06-10

## 2021-08-21 ENCOUNTER — WALK IN (OUTPATIENT)
Dept: URGENT CARE | Age: 31
End: 2021-08-21
Attending: EMERGENCY MEDICINE

## 2021-08-21 VITALS
SYSTOLIC BLOOD PRESSURE: 135 MMHG | WEIGHT: 190 LBS | RESPIRATION RATE: 14 BRPM | DIASTOLIC BLOOD PRESSURE: 96 MMHG | OXYGEN SATURATION: 99 % | HEART RATE: 83 BPM | TEMPERATURE: 98.3 F

## 2021-08-21 DIAGNOSIS — J02.9 SORETHROAT: Primary | ICD-10-CM

## 2021-08-21 DIAGNOSIS — J06.9 ACUTE UPPER RESPIRATORY INFECTION, UNSPECIFIED: ICD-10-CM

## 2021-08-21 LAB
INTERNAL PROCEDURAL CONTROLS ACCEPTABLE: YES
S PYO AG THROAT QL IA.RAPID: NEGATIVE
SARS-COV+SARS-COV-2 AG RESP QL IA.RAPID: NOT DETECTED

## 2021-08-21 PROCEDURE — 99203 OFFICE O/P NEW LOW 30 MIN: CPT

## 2021-08-21 PROCEDURE — 87880 STREP A ASSAY W/OPTIC: CPT | Performed by: EMERGENCY MEDICINE

## 2021-08-21 PROCEDURE — 87426 SARSCOV CORONAVIRUS AG IA: CPT | Performed by: EMERGENCY MEDICINE

## 2021-08-21 PROCEDURE — C9803 HOPD COVID-19 SPEC COLLECT: HCPCS

## 2021-08-21 ASSESSMENT — ENCOUNTER SYMPTOMS
EYE DISCHARGE: 0
EYE ITCHING: 0
TROUBLE SWALLOWING: 0
SWOLLEN GLANDS: 0
ABDOMINAL DISTENTION: 0
CHEST TIGHTNESS: 0
ADENOPATHY: 0
CONSTIPATION: 0
WHEEZING: 0
SINUS PAIN: 0
WOUND: 0
SHORTNESS OF BREATH: 0
FEVER: 0
RHINORRHEA: 0
PHOTOPHOBIA: 0
COUGH: 0
FATIGUE: 0
NAUSEA: 0
SINUS PRESSURE: 0
COLOR CHANGE: 0
EYE REDNESS: 0
DIARRHEA: 0
EYE PAIN: 0
VOMITING: 0
ABDOMINAL PAIN: 0
SORE THROAT: 1
NUMBNESS: 0
ANOREXIA: 0
CHANGE IN BOWEL HABIT: 0
CHILLS: 0
DIAPHORESIS: 0

## 2021-10-12 ENCOUNTER — HOSPITAL ENCOUNTER (OUTPATIENT)
Dept: GENERAL RADIOLOGY | Age: 31
Discharge: HOME | End: 2021-10-12
Attending: EMERGENCY MEDICINE

## 2021-10-12 ENCOUNTER — WALK IN (OUTPATIENT)
Dept: URGENT CARE | Age: 31
End: 2021-10-12
Attending: EMERGENCY MEDICINE

## 2021-10-12 VITALS
DIASTOLIC BLOOD PRESSURE: 89 MMHG | HEART RATE: 80 BPM | TEMPERATURE: 98.6 F | WEIGHT: 190 LBS | SYSTOLIC BLOOD PRESSURE: 129 MMHG
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
## 2021-10-12 DIAGNOSIS — S62.316A CLOSED DISPLACED FRACTURE OF BASE OF FIFTH METACARPAL BONE OF RIGHT HAND, INITIAL ENCOUNTER: ICD-10-CM

## 2021-10-12 PROCEDURE — 73110 X-RAY EXAM OF WRIST: CPT

## 2021-10-12 PROCEDURE — 73130 X-RAY EXAM OF HAND: CPT

## 2021-10-12 PROCEDURE — 29125 APPL SHORT ARM SPLINT STATIC: CPT

## 2021-10-12 PROCEDURE — 99213 OFFICE O/P EST LOW 20 MIN: CPT

## 2021-10-12 RX ORDER — NAPROXEN 500 MG/1
500 TABLET ORAL 2 TIMES DAILY WITH MEALS
Qty: 20 TABLET | Refills: 0 | Status: SHIPPED | OUTPATIENT
Start: 2021-10-12

## 2021-10-12 RX ORDER — CYCLOBENZAPRINE HCL 10 MG
10 TABLET ORAL NIGHTLY PRN
Qty: 10 TABLET | Refills: 0 | Status: SHIPPED | OUTPATIENT
Start: 2021-10-12 | End: 2021-10-22

## 2021-10-12 ASSESSMENT — ENCOUNTER SYMPTOMS
ENDOCRINE NEGATIVE: 1
PSYCHIATRIC NEGATIVE: 1
EYES NEGATIVE: 1
CONSTITUTIONAL NEGATIVE: 1
GASTROINTESTINAL NEGATIVE: 1
RESPIRATORY NEGATIVE: 1
ALLERGIC/IMMUNOLOGIC NEGATIVE: 1
HEMATOLOGIC/LYMPHATIC NEGATIVE: 1
NEUROLOGICAL NEGATIVE: 1

## 2021-10-19 ENCOUNTER — WALK IN (OUTPATIENT)
Dept: URGENT CARE | Age: 31
End: 2021-10-19
Attending: FAMILY MEDICINE

## 2021-10-19 VITALS
SYSTOLIC BLOOD PRESSURE: 133 MMHG | TEMPERATURE: 98 F | RESPIRATION RATE: 18 BRPM | HEART RATE: 78 BPM | WEIGHT: 190 LBS | OXYGEN SATURATION: 99 % | DIASTOLIC BLOOD PRESSURE: 93 MMHG

## 2021-10-19 DIAGNOSIS — R05.9 COUGH: Primary | ICD-10-CM

## 2021-10-19 PROCEDURE — 99212 OFFICE O/P EST SF 10 MIN: CPT

## 2021-10-19 PROCEDURE — C9803 HOPD COVID-19 SPEC COLLECT: HCPCS

## 2021-10-19 PROCEDURE — 87426 SARSCOV CORONAVIRUS AG IA: CPT | Performed by: FAMILY MEDICINE

## 2021-10-19 PROCEDURE — 87081 CULTURE SCREEN ONLY: CPT | Performed by: FAMILY MEDICINE

## 2021-10-19 PROCEDURE — 87880 STREP A ASSAY W/OPTIC: CPT | Performed by: FAMILY MEDICINE

## 2021-10-19 ASSESSMENT — PAIN SCALES - GENERAL: PAINLEVEL: 6

## 2021-10-19 ASSESSMENT — ENCOUNTER SYMPTOMS
COUGH: 1
SORE THROAT: 1
FATIGUE: 1

## 2021-10-21 LAB — S PYO SPEC QL CULT: NORMAL

## 2021-10-27 DIAGNOSIS — S62.306A: Primary | ICD-10-CM

## 2021-11-10 ENCOUNTER — APPOINTMENT (OUTPATIENT)
Dept: REHABILITATION | Age: 31
End: 2021-11-10
Attending: SURGERY

## 2022-03-08 ENCOUNTER — WALK IN (OUTPATIENT)
Dept: URGENT CARE | Age: 32
End: 2022-03-08
Attending: EMERGENCY MEDICINE

## 2022-03-08 VITALS
DIASTOLIC BLOOD PRESSURE: 86 MMHG | OXYGEN SATURATION: 98 % | WEIGHT: 180 LBS | SYSTOLIC BLOOD PRESSURE: 124 MMHG | HEART RATE: 71 BPM | TEMPERATURE: 97.3 F | RESPIRATION RATE: 16 BRPM

## 2022-03-08 DIAGNOSIS — J06.9 ACUTE URI: ICD-10-CM

## 2022-03-08 DIAGNOSIS — Z20.822 SUSPECTED COVID-19 VIRUS INFECTION: Primary | ICD-10-CM

## 2022-03-08 LAB — SARS-COV+SARS-COV-2 AG RESP QL IA.RAPID: NOT DETECTED

## 2022-03-08 PROCEDURE — 87426 SARSCOV CORONAVIRUS AG IA: CPT | Performed by: NURSE PRACTITIONER

## 2022-03-08 PROCEDURE — 99213 OFFICE O/P EST LOW 20 MIN: CPT

## 2022-03-08 PROCEDURE — C9803 HOPD COVID-19 SPEC COLLECT: HCPCS

## 2022-03-08 ASSESSMENT — ENCOUNTER SYMPTOMS
FEVER: 0
EYE REDNESS: 0
NAUSEA: 1
ABDOMINAL PAIN: 0
DIARRHEA: 1
EYE DISCHARGE: 0
WEAKNESS: 0
SORE THROAT: 1
COUGH: 1
DIZZINESS: 0
CHILLS: 0
DIAPHORESIS: 0
VOMITING: 0
EYE PAIN: 0

## 2022-12-07 ENCOUNTER — HOSPITAL ENCOUNTER (EMERGENCY)
Facility: HOSPITAL | Age: 32
Discharge: HOME | End: 2022-12-07
Attending: EMERGENCY MEDICINE
Payer: MEDICAID

## 2022-12-07 ENCOUNTER — HOSPITAL ENCOUNTER (OUTPATIENT)
Age: 32
Discharge: LEFT BEFORE DISCHARGE | End: 2022-12-07
Payer: MEDICAID

## 2022-12-07 VITALS
BODY MASS INDEX: 29.02 KG/M2 | OXYGEN SATURATION: 98 % | SYSTOLIC BLOOD PRESSURE: 132 MMHG | TEMPERATURE: 100 F | DIASTOLIC BLOOD PRESSURE: 82 MMHG | RESPIRATION RATE: 20 BRPM | WEIGHT: 170 LBS | HEART RATE: 90 BPM | HEIGHT: 64 IN

## 2022-12-07 DIAGNOSIS — A08.4 VIRAL GASTROENTERITIS: Primary | ICD-10-CM

## 2022-12-07 LAB
ALBUMIN SERPL-MCNC: 4.1 G/DL (ref 3.4–5)
ALBUMIN/GLOB SERPL: 1.1 {RATIO} (ref 1–2)
ALP LIVER SERPL-CCNC: 65 U/L
ALT SERPL-CCNC: 22 U/L
ANION GAP SERPL CALC-SCNC: 6 MMOL/L (ref 0–18)
AST SERPL-CCNC: 15 U/L (ref 15–37)
BASOPHILS # BLD AUTO: 0.01 X10(3) UL (ref 0–0.2)
BASOPHILS NFR BLD AUTO: 0.1 %
BILIRUB SERPL-MCNC: 0.6 MG/DL (ref 0.1–2)
BUN BLD-MCNC: 13 MG/DL (ref 7–18)
BUN/CREAT SERPL: 18.8 (ref 10–20)
CALCIUM BLD-MCNC: 9.3 MG/DL (ref 8.5–10.1)
CHLORIDE SERPL-SCNC: 107 MMOL/L (ref 98–112)
CO2 SERPL-SCNC: 24 MMOL/L (ref 21–32)
CREAT BLD-MCNC: 0.69 MG/DL
DEPRECATED RDW RBC AUTO: 43 FL (ref 35.1–46.3)
EOSINOPHIL # BLD AUTO: 0.04 X10(3) UL (ref 0–0.7)
EOSINOPHIL NFR BLD AUTO: 0.4 %
ERYTHROCYTE [DISTWIDTH] IN BLOOD BY AUTOMATED COUNT: 14.8 % (ref 11–15)
FLUAV + FLUBV RNA SPEC NAA+PROBE: NEGATIVE
FLUAV + FLUBV RNA SPEC NAA+PROBE: NEGATIVE
GFR SERPLBLD BASED ON 1.73 SQ M-ARVRAT: 118 ML/MIN/1.73M2 (ref 60–?)
GLOBULIN PLAS-MCNC: 3.8 G/DL (ref 2.8–4.4)
GLUCOSE BLD-MCNC: 90 MG/DL (ref 70–99)
HCG SERPL QL: NEGATIVE
HCT VFR BLD AUTO: 43.6 %
HGB BLD-MCNC: 14.2 G/DL
IMM GRANULOCYTES # BLD AUTO: 0.04 X10(3) UL (ref 0–1)
IMM GRANULOCYTES NFR BLD: 0.4 %
LYMPHOCYTES # BLD AUTO: 0.67 X10(3) UL (ref 1–4)
LYMPHOCYTES NFR BLD AUTO: 7.3 %
MCH RBC QN AUTO: 26.2 PG (ref 26–34)
MCHC RBC AUTO-ENTMCNC: 32.6 G/DL (ref 31–37)
MCV RBC AUTO: 80.3 FL
MONOCYTES # BLD AUTO: 0.39 X10(3) UL (ref 0.1–1)
MONOCYTES NFR BLD AUTO: 4.3 %
NEUTROPHILS # BLD AUTO: 8 X10 (3) UL (ref 1.5–7.7)
NEUTROPHILS # BLD AUTO: 8 X10(3) UL (ref 1.5–7.7)
NEUTROPHILS NFR BLD AUTO: 87.5 %
OSMOLALITY SERPL CALC.SUM OF ELEC: 284 MOSM/KG (ref 275–295)
PLATELET # BLD AUTO: 245 10(3)UL (ref 150–450)
POTASSIUM SERPL-SCNC: 3.7 MMOL/L (ref 3.5–5.1)
PROT SERPL-MCNC: 7.9 G/DL (ref 6.4–8.2)
RBC # BLD AUTO: 5.43 X10(6)UL
RSV RNA SPEC NAA+PROBE: NEGATIVE
SARS-COV-2 RNA RESP QL NAA+PROBE: NOT DETECTED
SODIUM SERPL-SCNC: 137 MMOL/L (ref 136–145)
WBC # BLD AUTO: 9.2 X10(3) UL (ref 4–11)

## 2022-12-07 PROCEDURE — 0241U SARS-COV-2/FLU A AND B/RSV BY PCR (GENEXPERT): CPT | Performed by: EMERGENCY MEDICINE

## 2022-12-07 PROCEDURE — 85025 COMPLETE CBC W/AUTO DIFF WBC: CPT | Performed by: EMERGENCY MEDICINE

## 2022-12-07 PROCEDURE — 84703 CHORIONIC GONADOTROPIN ASSAY: CPT | Performed by: EMERGENCY MEDICINE

## 2022-12-07 PROCEDURE — 96361 HYDRATE IV INFUSION ADD-ON: CPT

## 2022-12-07 PROCEDURE — 99284 EMERGENCY DEPT VISIT MOD MDM: CPT

## 2022-12-07 PROCEDURE — 80053 COMPREHEN METABOLIC PANEL: CPT | Performed by: EMERGENCY MEDICINE

## 2022-12-07 PROCEDURE — 96374 THER/PROPH/DIAG INJ IV PUSH: CPT

## 2022-12-07 PROCEDURE — 96375 TX/PRO/DX INJ NEW DRUG ADDON: CPT

## 2022-12-07 RX ORDER — KETOROLAC TROMETHAMINE 10 MG/1
10 TABLET, FILM COATED ORAL EVERY 6 HOURS PRN
Qty: 20 TABLET | Refills: 0 | Status: SHIPPED | OUTPATIENT
Start: 2022-12-07 | End: 2022-12-12

## 2022-12-07 RX ORDER — ONDANSETRON 2 MG/ML
4 INJECTION INTRAMUSCULAR; INTRAVENOUS ONCE
Status: COMPLETED | OUTPATIENT
Start: 2022-12-07 | End: 2022-12-07

## 2022-12-07 RX ORDER — ONDANSETRON 4 MG/1
4 TABLET, ORALLY DISINTEGRATING ORAL EVERY 4 HOURS PRN
Qty: 15 TABLET | Refills: 0 | Status: SHIPPED | OUTPATIENT
Start: 2022-12-07

## 2022-12-07 RX ORDER — KETOROLAC TROMETHAMINE 30 MG/ML
15 INJECTION, SOLUTION INTRAMUSCULAR; INTRAVENOUS ONCE
Status: COMPLETED | OUTPATIENT
Start: 2022-12-07 | End: 2022-12-07

## 2022-12-07 NOTE — ED QUICK NOTES
Per registration patient was waiting in her car in the parking lot. Patient was called around 12:00 when room was available, stated that she was in the parking lot and will be coming in, called patient again, 12:10 after patient did not respond when called in the waiting room. Patient now in the waiting room, and waiting for a room to become available again. 12:17 patient was called as room was available, no answer. 12:19 per registration patient left and stated she did not want to be seen.

## 2022-12-07 NOTE — ED INITIAL ASSESSMENT (HPI)
Pt to ED with c/o N/V/D and abdominal pain since 0230 this am. +cough and sore throat. No respiratory distress noted.

## 2023-02-14 ENCOUNTER — HOSPITAL ENCOUNTER (EMERGENCY)
Facility: HOSPITAL | Age: 33
Discharge: HOME | End: 2023-02-14
Attending: EMERGENCY MEDICINE
Payer: MEDICAID

## 2023-02-14 VITALS
BODY MASS INDEX: 28.17 KG/M2 | TEMPERATURE: 98 F | OXYGEN SATURATION: 99 % | SYSTOLIC BLOOD PRESSURE: 145 MMHG | WEIGHT: 165 LBS | HEART RATE: 88 BPM | DIASTOLIC BLOOD PRESSURE: 78 MMHG | RESPIRATION RATE: 16 BRPM | HEIGHT: 64 IN

## 2023-02-14 DIAGNOSIS — K64.4 EXTERNAL HEMORRHOID: Primary | ICD-10-CM

## 2023-02-14 LAB
BASOPHILS # BLD AUTO: 0.04 X10(3) UL (ref 0–0.2)
BASOPHILS NFR BLD AUTO: 0.5 %
DEPRECATED RDW RBC AUTO: 44.3 FL (ref 35.1–46.3)
EOSINOPHIL # BLD AUTO: 0.07 X10(3) UL (ref 0–0.7)
EOSINOPHIL NFR BLD AUTO: 0.9 %
ERYTHROCYTE [DISTWIDTH] IN BLOOD BY AUTOMATED COUNT: 15.2 % (ref 11–15)
HCT VFR BLD AUTO: 38 %
HGB BLD-MCNC: 11.8 G/DL
IMM GRANULOCYTES # BLD AUTO: 0.02 X10(3) UL (ref 0–1)
IMM GRANULOCYTES NFR BLD: 0.3 %
LYMPHOCYTES # BLD AUTO: 2.76 X10(3) UL (ref 1–4)
LYMPHOCYTES NFR BLD AUTO: 36.2 %
MCH RBC QN AUTO: 24.9 PG (ref 26–34)
MCHC RBC AUTO-ENTMCNC: 31.1 G/DL (ref 31–37)
MCV RBC AUTO: 80.3 FL
MONOCYTES # BLD AUTO: 0.58 X10(3) UL (ref 0.1–1)
MONOCYTES NFR BLD AUTO: 7.6 %
NEUTROPHILS # BLD AUTO: 4.16 X10 (3) UL (ref 1.5–7.7)
NEUTROPHILS # BLD AUTO: 4.16 X10(3) UL (ref 1.5–7.7)
NEUTROPHILS NFR BLD AUTO: 54.5 %
PLATELET # BLD AUTO: 325 10(3)UL (ref 150–450)
RBC # BLD AUTO: 4.73 X10(6)UL
WBC # BLD AUTO: 7.6 X10(3) UL (ref 4–11)

## 2023-02-14 PROCEDURE — 36415 COLL VENOUS BLD VENIPUNCTURE: CPT

## 2023-02-14 PROCEDURE — 99283 EMERGENCY DEPT VISIT LOW MDM: CPT

## 2023-02-14 PROCEDURE — 99284 EMERGENCY DEPT VISIT MOD MDM: CPT

## 2023-02-14 PROCEDURE — 85025 COMPLETE CBC W/AUTO DIFF WBC: CPT | Performed by: EMERGENCY MEDICINE

## 2023-02-14 RX ORDER — HYDROCODONE BITARTRATE AND ACETAMINOPHEN 5; 325 MG/1; MG/1
1 TABLET ORAL ONCE
Status: COMPLETED | OUTPATIENT
Start: 2023-02-14 | End: 2023-02-14

## 2023-02-14 RX ORDER — LIDOCAINE 50 MG/G
OINTMENT TOPICAL ONCE
Status: COMPLETED | OUTPATIENT
Start: 2023-02-14 | End: 2023-02-14

## 2023-02-14 NOTE — ED INITIAL ASSESSMENT (HPI)
Patient presents to ER with concerns of \"thrombosed hemorrhoids\"   Notes a lot of pain. Hx consistent hemorrhoids, with bleeding.   Has appointment with specialist on 3/7

## 2023-03-07 ENCOUNTER — OFFICE VISIT (OUTPATIENT)
Dept: GASTROENTEROLOGY | Facility: CLINIC | Age: 33
End: 2023-03-07

## 2023-03-07 VITALS
BODY MASS INDEX: 28.17 KG/M2 | SYSTOLIC BLOOD PRESSURE: 136 MMHG | HEART RATE: 64 BPM | DIASTOLIC BLOOD PRESSURE: 98 MMHG | WEIGHT: 165 LBS | HEIGHT: 64 IN

## 2023-03-07 DIAGNOSIS — K64.5 THROMBOSED HEMORRHOIDS: Primary | ICD-10-CM

## 2023-03-07 DIAGNOSIS — K62.5 RECTAL BLEEDING: ICD-10-CM

## 2023-03-07 DIAGNOSIS — K62.89 ANAL PAIN: ICD-10-CM

## 2023-03-07 DIAGNOSIS — K60.2 ANAL FISSURE: ICD-10-CM

## 2023-03-07 PROCEDURE — 3008F BODY MASS INDEX DOCD: CPT | Performed by: INTERNAL MEDICINE

## 2023-03-07 PROCEDURE — 3075F SYST BP GE 130 - 139MM HG: CPT | Performed by: INTERNAL MEDICINE

## 2023-03-07 PROCEDURE — 3080F DIAST BP >= 90 MM HG: CPT | Performed by: INTERNAL MEDICINE

## 2023-03-07 PROCEDURE — 99244 OFF/OP CNSLTJ NEW/EST MOD 40: CPT | Performed by: INTERNAL MEDICINE

## 2023-03-07 NOTE — PATIENT INSTRUCTIONS
PLAN      - Take miralax 1 capful every day and adjust it to ensure soft bowel movementts    - Avoid straining    - Continue sitz baths for 20 min twice a day    - Try nifedipine ointment 1 pea sized amount to the affected area twice a day for 6 weeks    - See general surgery for your thrombosed hemorrhoids    - Follow up after the pain is better to discuss a colonoscopy (in ~2 months)

## 2023-03-13 ENCOUNTER — OFFICE VISIT (OUTPATIENT)
Dept: SURGERY | Facility: CLINIC | Age: 33
End: 2023-03-13

## 2023-03-13 VITALS — WEIGHT: 165 LBS | BODY MASS INDEX: 28.17 KG/M2 | HEIGHT: 64 IN

## 2023-03-13 DIAGNOSIS — K64.3 GRADE IV HEMORRHOIDS: Primary | ICD-10-CM

## 2023-03-13 DIAGNOSIS — K64.8 HEMORRHOIDS, COMPLICATED: ICD-10-CM

## 2023-03-18 ENCOUNTER — LAB ENCOUNTER (OUTPATIENT)
Dept: LAB | Age: 33
End: 2023-03-18
Attending: SURGERY
Payer: MEDICAID

## 2023-03-18 DIAGNOSIS — Z01.818 PRE-OP TESTING: ICD-10-CM

## 2023-03-18 RX ORDER — DOCUSATE SODIUM 100 MG/1
100 CAPSULE, LIQUID FILLED ORAL 2 TIMES DAILY
COMMUNITY

## 2023-03-19 LAB — SARS-COV-2 RNA RESP QL NAA+PROBE: NOT DETECTED

## 2023-03-20 NOTE — DISCHARGE INSTRUCTIONS
Discharge Instructions: After Your Surgery    Tylenol or Norco along with Ibuprofen for pain. Norco cannot be taken with Tylenol as it contains Tylenol. Take one or the other, not both. Sigmund Barks may be taken along with Ibuprofen. See Dr Janie Mcmahon in  2 wk for a  virtual or telephone visit, if necessary, an in office visit may will be planned. May use ice pack prn. Stool softeners up to tid prn. It is important to have soft bowel movements, do not become constipated. Use Miralax or laxatives if necessary. Avoid constipation. Keep incisions clean, wet wipes, showers as needed. HOME INSTRUCTIONS  [facility] HOME CARE INSTRUCTIONS: POST-OP ANESTHESIA  The medication that you received for sedation or general anesthesia can last up to 24 hours. Your judgment and reflexes may be altered, even if you feel like your normal self. We Recommend:   Do not drive any motor vehicle or bicycle   Avoid mowing the lawn, playing sports, or working with power tools/applicances (power saws, electric knives or mixers)   That you have someone stay with you on your first night home   Do not drink alcohol or take sleeping pills or tranquilizers   Do not sign legal documents within 24 hours of your procedure   If you had a nerve block for your surgery, take extra care not to put any pressure on your arm or hand for 24 hours    It is normal:  For you to have a sore throat if you had a breathing tube during surgery (while you were asleep!). The sore throat should get better within 48 hours. You can gargle with warm salt water (1/2 tsp in 4 oz warm water) or use a throat lozenge for comfort  To feel muscle aches or soreness especially in the abdomen, chest or neck. The achy feeling should go away in the next 24 hours  To feel weak, sleepy or \"wiped out\". Your should start feeling better in the next 24 hours.    To experience mild discomforts such as sore lip or tongue, headache, cramps, gas pains or a bloated feeling in your abdomen. To experience mild back pain or soreness for a day or two if you had spinal or epidural anesthesia. If you had laparoscopic surgery, to feel shoulder pain or discomfort on the day of surgery. For some patients to have nausea after surgery/anesthesia    If you feel nausea or experience vomiting:   Try to move around less.    Eat less than usual or drink only liquids until the next morning   Nausea should resolve in about 24 hours    If you have a problem when you are at home:    Call your surgeons office

## 2023-03-21 ENCOUNTER — ANESTHESIA EVENT (OUTPATIENT)
Dept: SURGERY | Facility: HOSPITAL | Age: 33
End: 2023-03-21
Payer: MEDICAID

## 2023-03-21 ENCOUNTER — ANESTHESIA (OUTPATIENT)
Dept: SURGERY | Facility: HOSPITAL | Age: 33
End: 2023-03-21
Payer: MEDICAID

## 2023-03-21 ENCOUNTER — HOSPITAL ENCOUNTER (OUTPATIENT)
Facility: HOSPITAL | Age: 33
Setting detail: HOSPITAL OUTPATIENT SURGERY
Discharge: HOME | End: 2023-03-21
Attending: SURGERY | Admitting: SURGERY
Payer: MEDICAID

## 2023-03-21 VITALS
TEMPERATURE: 98 F | HEART RATE: 70 BPM | BODY MASS INDEX: 28.17 KG/M2 | WEIGHT: 165 LBS | DIASTOLIC BLOOD PRESSURE: 96 MMHG | SYSTOLIC BLOOD PRESSURE: 143 MMHG | RESPIRATION RATE: 16 BRPM | OXYGEN SATURATION: 99 % | HEIGHT: 64 IN

## 2023-03-21 DIAGNOSIS — K64.3 GRADE IV HEMORRHOIDS: ICD-10-CM

## 2023-03-21 DIAGNOSIS — Z01.818 PRE-OP TESTING: Primary | ICD-10-CM

## 2023-03-21 LAB — B-HCG UR QL: NEGATIVE

## 2023-03-21 PROCEDURE — 06BY4ZC EXCISION OF HEMORRHOIDAL PLEXUS, PERCUTANEOUS ENDOSCOPIC APPROACH: ICD-10-PCS | Performed by: SURGERY

## 2023-03-21 PROCEDURE — 46260 REMOVE IN/EX HEM GROUPS 2+: CPT | Performed by: SURGERY

## 2023-03-21 RX ORDER — BUPIVACAINE HYDROCHLORIDE AND EPINEPHRINE 2.5; 5 MG/ML; UG/ML
INJECTION, SOLUTION INFILTRATION; PERINEURAL AS NEEDED
Status: DISCONTINUED | OUTPATIENT
Start: 2023-03-21 | End: 2023-03-21 | Stop reason: HOSPADM

## 2023-03-21 RX ORDER — ACETAMINOPHEN 500 MG
1000 TABLET ORAL ONCE
Status: COMPLETED | OUTPATIENT
Start: 2023-03-21 | End: 2023-03-21

## 2023-03-21 RX ORDER — MORPHINE SULFATE 4 MG/ML
2 INJECTION, SOLUTION INTRAMUSCULAR; INTRAVENOUS EVERY 10 MIN PRN
Status: DISCONTINUED | OUTPATIENT
Start: 2023-03-21 | End: 2023-03-21

## 2023-03-21 RX ORDER — DOCUSATE SODIUM 100 MG/1
100 CAPSULE, LIQUID FILLED ORAL 3 TIMES DAILY PRN
Qty: 60 CAPSULE | Refills: 0 | Status: SHIPPED | OUTPATIENT
Start: 2023-03-21

## 2023-03-21 RX ORDER — PROCHLORPERAZINE EDISYLATE 5 MG/ML
5 INJECTION INTRAMUSCULAR; INTRAVENOUS EVERY 8 HOURS PRN
Status: DISCONTINUED | OUTPATIENT
Start: 2023-03-21 | End: 2023-03-21

## 2023-03-21 RX ORDER — SODIUM CHLORIDE, SODIUM LACTATE, POTASSIUM CHLORIDE, CALCIUM CHLORIDE 600; 310; 30; 20 MG/100ML; MG/100ML; MG/100ML; MG/100ML
INJECTION, SOLUTION INTRAVENOUS CONTINUOUS
Status: DISCONTINUED | OUTPATIENT
Start: 2023-03-21 | End: 2023-03-21

## 2023-03-21 RX ORDER — ONDANSETRON 2 MG/ML
4 INJECTION INTRAMUSCULAR; INTRAVENOUS EVERY 6 HOURS PRN
Status: DISCONTINUED | OUTPATIENT
Start: 2023-03-21 | End: 2023-03-21

## 2023-03-21 RX ORDER — DIBUCAINE 0.28 G/28G
OINTMENT TOPICAL AS NEEDED
Status: DISCONTINUED | OUTPATIENT
Start: 2023-03-21 | End: 2023-03-21 | Stop reason: HOSPADM

## 2023-03-21 RX ORDER — DEXAMETHASONE SODIUM PHOSPHATE 4 MG/ML
VIAL (ML) INJECTION AS NEEDED
Status: DISCONTINUED | OUTPATIENT
Start: 2023-03-21 | End: 2023-03-21 | Stop reason: SURG

## 2023-03-21 RX ORDER — MORPHINE SULFATE 10 MG/ML
6 INJECTION, SOLUTION INTRAMUSCULAR; INTRAVENOUS EVERY 10 MIN PRN
Status: DISCONTINUED | OUTPATIENT
Start: 2023-03-21 | End: 2023-03-21

## 2023-03-21 RX ORDER — HYDROMORPHONE HYDROCHLORIDE 1 MG/ML
0.4 INJECTION, SOLUTION INTRAMUSCULAR; INTRAVENOUS; SUBCUTANEOUS EVERY 5 MIN PRN
Status: DISCONTINUED | OUTPATIENT
Start: 2023-03-21 | End: 2023-03-21

## 2023-03-21 RX ORDER — HYDROCODONE BITARTRATE AND ACETAMINOPHEN 5; 325 MG/1; MG/1
1 TABLET ORAL EVERY 6 HOURS PRN
Qty: 30 TABLET | Refills: 0 | Status: SHIPPED | OUTPATIENT
Start: 2023-03-21

## 2023-03-21 RX ORDER — IBUPROFEN 600 MG/1
600 TABLET ORAL EVERY 8 HOURS PRN
Qty: 30 TABLET | Refills: 0 | Status: SHIPPED | OUTPATIENT
Start: 2023-03-21

## 2023-03-21 RX ORDER — CEFAZOLIN SODIUM/WATER 2 G/20 ML
2 SYRINGE (ML) INTRAVENOUS ONCE
Status: DISCONTINUED | OUTPATIENT
Start: 2023-03-21 | End: 2023-03-21 | Stop reason: HOSPADM

## 2023-03-21 RX ORDER — ONDANSETRON 2 MG/ML
INJECTION INTRAMUSCULAR; INTRAVENOUS AS NEEDED
Status: DISCONTINUED | OUTPATIENT
Start: 2023-03-21 | End: 2023-03-21 | Stop reason: SURG

## 2023-03-21 RX ORDER — HYDROMORPHONE HYDROCHLORIDE 1 MG/ML
0.6 INJECTION, SOLUTION INTRAMUSCULAR; INTRAVENOUS; SUBCUTANEOUS EVERY 5 MIN PRN
Status: DISCONTINUED | OUTPATIENT
Start: 2023-03-21 | End: 2023-03-21

## 2023-03-21 RX ORDER — MIDAZOLAM HYDROCHLORIDE 1 MG/ML
INJECTION INTRAMUSCULAR; INTRAVENOUS AS NEEDED
Status: DISCONTINUED | OUTPATIENT
Start: 2023-03-21 | End: 2023-03-21 | Stop reason: SURG

## 2023-03-21 RX ORDER — LIDOCAINE HYDROCHLORIDE 10 MG/ML
INJECTION, SOLUTION EPIDURAL; INFILTRATION; INTRACAUDAL; PERINEURAL AS NEEDED
Status: DISCONTINUED | OUTPATIENT
Start: 2023-03-21 | End: 2023-03-21 | Stop reason: SURG

## 2023-03-21 RX ORDER — NALOXONE HYDROCHLORIDE 0.4 MG/ML
80 INJECTION, SOLUTION INTRAMUSCULAR; INTRAVENOUS; SUBCUTANEOUS AS NEEDED
Status: DISCONTINUED | OUTPATIENT
Start: 2023-03-21 | End: 2023-03-21

## 2023-03-21 RX ORDER — HYDROMORPHONE HYDROCHLORIDE 1 MG/ML
0.2 INJECTION, SOLUTION INTRAMUSCULAR; INTRAVENOUS; SUBCUTANEOUS EVERY 5 MIN PRN
Status: DISCONTINUED | OUTPATIENT
Start: 2023-03-21 | End: 2023-03-21

## 2023-03-21 RX ORDER — MORPHINE SULFATE 4 MG/ML
4 INJECTION, SOLUTION INTRAMUSCULAR; INTRAVENOUS EVERY 10 MIN PRN
Status: DISCONTINUED | OUTPATIENT
Start: 2023-03-21 | End: 2023-03-21

## 2023-03-21 RX ADMIN — DEXAMETHASONE SODIUM PHOSPHATE 4 MG: 4 MG/ML VIAL (ML) INJECTION at 17:36:00

## 2023-03-21 RX ADMIN — SODIUM CHLORIDE, SODIUM LACTATE, POTASSIUM CHLORIDE, CALCIUM CHLORIDE: 600; 310; 30; 20 INJECTION, SOLUTION INTRAVENOUS at 17:29:00

## 2023-03-21 RX ADMIN — MIDAZOLAM HYDROCHLORIDE 2 MG: 1 INJECTION INTRAMUSCULAR; INTRAVENOUS at 17:36:00

## 2023-03-21 RX ADMIN — LIDOCAINE HYDROCHLORIDE 50 MG: 10 INJECTION, SOLUTION EPIDURAL; INFILTRATION; INTRACAUDAL; PERINEURAL at 17:36:00

## 2023-03-21 RX ADMIN — ONDANSETRON 4 MG: 2 INJECTION INTRAMUSCULAR; INTRAVENOUS at 17:36:00

## 2023-03-21 NOTE — ANESTHESIA PROCEDURE NOTES
Airway  Date/Time: 3/21/2023 5:37 PM  Urgency: Elective      General Information and Staff    Patient location during procedure: OR  Performed: anesthesiologist   Performed by: Murphy Gowers, MD  Authorized by: Murphy Gowers, MD      Indications and Patient Condition  Indications for airway management: anesthesia  Sedation level: deep  Preoxygenated: yes  Patient position: sniffing  Mask difficulty assessment: 1 - vent by mask    Final Airway Details  Final airway type: supraglottic airway      Successful airway: classic  Size 4       Number of attempts at approach: 1

## 2023-03-21 NOTE — BRIEF OP NOTE
Pre-Operative Diagnosis: Grade IV hemorrhoids [K64.3]     Post-Operative Diagnosis: Grade IV hemorrhoids [K64.3]      Procedure Performed: Rectal exam under anesthesia, complex complete internal to external hemorrhoidectomy x 3 (1 anterior, 2 posterior bilateral), hemorrhoid banding x 2  Surgeon(s) and Role:     * Lalo Fabian MD - Primary    Assistant(s):  Surgical Assistant.: Sandy Estrella I     Surgical Findings: severe prolapsing Grade IV hemorrhoids and severe external hemorrhoids     Specimen: hemorrhoids     Estimated Blood Loss: 5 mL         Ciarra Ontiveros MD  3/21/2023  6:19 PM

## 2023-03-21 NOTE — OPERATIVE REPORT
Pre-Operative Diagnosis: Grade IV hemorrhoids [K64.3]     Post-Operative Diagnosis: Grade IV hemorrhoids [K64.3]      Procedure Performed: Rectal exam under anesthesia, complex complete internal to external hemorrhoidectomy x 3 (1 anterior, 2 posterior bilateral), hemorrhoid banding x 2  Surgeon(s) and Role:     * Kevan Pedro MD - Primary    Assistant(s):  Surgical Assistant.: Mohiuddin, Corinne Castle I     Surgical Findings: severe prolapsing Grade IV hemorrhoids and severe external hemorrhoids     Specimen: hemorrhoids     Estimated Blood Loss: 5 mL         Indications: The patient has had ongoing and worsening chronic complicated hemorrhoids. She has prolapsing internal hemorrhoids and circumferential external hemorrhoids. The patient has tried numerous conservative measures but her hemorrhoids continue to worsen. The risks and benefits of surgery, the possibility of need for a staged procedure, the various surgical approaches and indication, as well as recovery were all discussed. All of the patient's questions were answered to their satisfaction. Procedure: The patient was brought to the operating room placed in the supine position on the operating table. After anesthesia was administered the patient was repositioned to lithotomy position. Digital rectal exam and anoscopy were carried out, next using a speculum a thorough exam of the rectal mucosa was exam was carried out. There were severe, circumferential hemorrhoids prolapsing and severe external hemorrhoids which were also circumferential.  Three complex complete internal to external hemorrhoidectomies were performed: one anterior and two posterior bilateral. The incisions were closed with chromic locking suture internally and externally with interrupted chromic suture. Two of the largest remaining the internal hemorrhoids were banded.   Marcaine was injected a sterile dressing applied the patient was awakened and brought to recovery in good condition the hemorrhoid specimens were sent for routine pathologic evaluation.

## 2023-03-21 NOTE — INTERVAL H&P NOTE
Pre-op Diagnosis: Grade IV hemorrhoids [K64.3]    The above referenced H&P was reviewed by Ralph Waddell MD on 3/21/2023, the patient was examined and no significant changes have occurred in the patient's condition since the H&P was performed. I discussed with the patient and/or legal representative the potential benefits, risks and side effects of this procedure; the likelihood of the patient achieving goals; and potential problems that might occur during recuperation. I discussed reasonable alternatives to the procedure, including risks, benefits and side effects related to the alternatives and risks related to not receiving this procedure. We will proceed with procedure as planned.

## 2023-03-21 NOTE — ANESTHESIA POSTPROCEDURE EVALUATION
Patient: Shaw Culver    Procedure Summary     Date: 03/21/23 Room / Location: [facility] OR 08 / [facility] OR    Anesthesia Start: 1729 Anesthesia Stop:     Procedure: Rectal exam under anesthesia,internal and external x3 hemorrhoidectomy and  banding, Diagnosis:       Grade IV hemorrhoids      (Grade IV hemorrhoids [K64.3])    Surgeons: Jc Dougherty MD Anesthesiologist: Chin Alas MD    Anesthesia Type: general ASA Status: 2          Anesthesia Type: general    Vitals Value Taken Time   /94 03/21/23 1829   Temp 97.7 03/21/23 1831   Pulse 98 03/21/23 1830   Resp 16 03/21/23 1830   SpO2 100 % 03/21/23 1830   Vitals shown include unvalidated device data.     [facility] Evaluation:   Patient Evaluated in PACU  Patient Participation: complete - patient participated  Level of Consciousness: awake  Pain Management: adequate  Airway Patency:patent  Dental exam unchanged from preop  Yes    Cardiovascular Status: acceptable  Respiratory Status: acceptable  Postoperative Hydration acceptable      Pily Abreu MD  3/21/2023 6:31 PM

## 2023-03-22 ENCOUNTER — TELEPHONE (OUTPATIENT)
Dept: SURGERY | Facility: CLINIC | Age: 33
End: 2023-03-22

## 2023-03-22 NOTE — TELEPHONE ENCOUNTER
Spoke to patient. Avoid using ointments to surgical area, will dissolve sutures. Discussed op report with patient.

## 2023-03-22 NOTE — TELEPHONE ENCOUNTER
Pt called stating pt had surgery yesterday 3-21-23. Question on recovery. Call transferred to the nurse.

## 2023-03-24 ENCOUNTER — TELEPHONE (OUTPATIENT)
Dept: SURGERY | Facility: CLINIC | Age: 33
End: 2023-03-24

## 2023-03-24 NOTE — TELEPHONE ENCOUNTER
Called patient back. She is experiencing a lot of pain and hasn't had a BM yet. Advised discontinue Norco ASAP, and alternate ibuprofen 600 mg (taken with food) with tylenol ES. Continue icing, keep rectal area clean and dry. Continue stool softeners 3 times daily, miralax daily, and 4 oz prune juice am and pm. Patient verbalized understanding. Scheduled PO apt for Fri 4/7/23 @ 11:30. [facility]

## 2023-03-24 NOTE — TELEPHONE ENCOUNTER
Per pt had surgery on 3/21, pt states she is having excruciating pain, pt is crying, unable to transfer call to RN. Please call thank you.

## 2023-08-09 NOTE — TELEPHONE ENCOUNTER
Please review. Protocol failed/ No protocol      Requested Prescriptions   Pending Prescriptions Disp Refills    ALPRAZolam 0.25 MG Oral Tab 15 tablet 0     Sig: Take 1 tablet (0.25 mg total) by mouth daily as needed.        There is no refill protocol information for this order          Future Appointments         Provider Department Appt Notes    In 2 weeks MD Jamin Norris Addison Follow up             Recent Outpatient Visits              2 months ago MANDY (generalized anxiety disorder)    [facility], [facility], Nasir Harrington MD    Telemedicine    4 months ago Grade IV hemorrhoids    [facility], [address], Ciara Cox MD    Office Visit    5 months ago Thrombosed hemorrhoids    [address] Soila Anaya MD    Office Visit    7 months ago Rectal bleeding    [facility], [address], Demetrio Tom MD    Telemedicine    2 years ago Encounter for supervision of normal first pregnancy in third trimester    [facility], [facility],[address], [address], [facility] Sherman Norwood MD    Routine Prenatal

## 2023-08-10 RX ORDER — ALPRAZOLAM 0.25 MG/1
0.25 TABLET ORAL DAILY PRN
Qty: 15 TABLET | Refills: 0 | Status: SHIPPED | OUTPATIENT
Start: 2023-08-10

## 2023-11-25 ENCOUNTER — HOSPITAL ENCOUNTER (OUTPATIENT)
Age: 33
Discharge: HOME | End: 2023-11-25
Payer: MEDICAID

## 2023-11-25 VITALS
DIASTOLIC BLOOD PRESSURE: 111 MMHG | HEART RATE: 87 BPM | TEMPERATURE: 98 F | OXYGEN SATURATION: 98 % | RESPIRATION RATE: 18 BRPM | SYSTOLIC BLOOD PRESSURE: 158 MMHG

## 2023-11-25 DIAGNOSIS — K52.9 GASTROENTERITIS: Primary | ICD-10-CM

## 2023-11-25 DIAGNOSIS — Z11.52 ENCOUNTER FOR SCREENING LABORATORY TESTING FOR COVID-19 VIRUS: ICD-10-CM

## 2023-11-25 LAB
#MXD IC: 0.8 X10ˆ3/UL (ref 0.1–1)
B-HCG UR QL: NEGATIVE
BILIRUB UR QL STRIP: NEGATIVE
BUN BLD-MCNC: 6 MG/DL (ref 7–18)
CHLORIDE BLD-SCNC: 103 MMOL/L (ref 98–112)
CO2 BLD-SCNC: 25 MMOL/L (ref 21–32)
COLOR UR: YELLOW
CREAT BLD-MCNC: 0.6 MG/DL
EGFRCR SERPLBLD CKD-EPI 2021: 121 ML/MIN/1.73M2 (ref 60–?)
GLUCOSE BLD-MCNC: 87 MG/DL (ref 70–99)
GLUCOSE UR STRIP-MCNC: NEGATIVE MG/DL
HCT VFR BLD AUTO: 41.1 %
HCT VFR BLD CALC: 44 %
HGB BLD-MCNC: 13.2 G/DL
HGB UR QL STRIP: NEGATIVE
ISTAT IONIZED CALCIUM FOR CHEM 8: 1.2 MMOL/L (ref 1.12–1.32)
KETONES UR STRIP-MCNC: NEGATIVE MG/DL
LEUKOCYTE ESTERASE UR QL STRIP: NEGATIVE
LYMPHOCYTES # BLD AUTO: 2.5 X10ˆ3/UL (ref 1–4)
LYMPHOCYTES NFR BLD AUTO: 32 %
MCH RBC QN AUTO: 26.3 PG (ref 26–34)
MCHC RBC AUTO-ENTMCNC: 32.1 G/DL (ref 31–37)
MCV RBC AUTO: 82 FL (ref 80–100)
MIXED CELL %: 10.4 %
NEUTROPHILS # BLD AUTO: 4.5 X10ˆ3/UL (ref 1.5–7.7)
NEUTROPHILS NFR BLD AUTO: 57.6 %
NITRITE UR QL STRIP: NEGATIVE
PH UR STRIP: 7 [PH]
PLATELET # BLD AUTO: 293 X10ˆ3/UL (ref 150–450)
POTASSIUM BLD-SCNC: 3.5 MMOL/L (ref 3.6–5.1)
PROT UR STRIP-MCNC: 30 MG/DL
RBC # BLD AUTO: 5.01 X10ˆ6/UL
S PYO AG THROAT QL: NEGATIVE
SARS-COV-2 RNA RESP QL NAA+PROBE: NOT DETECTED
SODIUM BLD-SCNC: 140 MMOL/L (ref 136–145)
SP GR UR STRIP: 1.02
UROBILINOGEN UR STRIP-ACNC: <2 MG/DL
WBC # BLD AUTO: 7.8 X10ˆ3/UL (ref 4–11)

## 2023-11-25 RX ORDER — METOCLOPRAMIDE 10 MG/1
10 TABLET ORAL 3 TIMES DAILY PRN
Qty: 20 TABLET | Refills: 0 | Status: SHIPPED | OUTPATIENT
Start: 2023-11-25 | End: 2023-12-25

## 2023-11-25 RX ORDER — FAMOTIDINE 20 MG/1
20 TABLET, FILM COATED ORAL 2 TIMES DAILY
Qty: 14 TABLET | Refills: 0 | Status: SHIPPED | OUTPATIENT
Start: 2023-11-25 | End: 2023-12-02

## 2023-11-25 RX ORDER — ONDANSETRON 2 MG/ML
4 INJECTION INTRAMUSCULAR; INTRAVENOUS ONCE
Status: COMPLETED | OUTPATIENT
Start: 2023-11-25 | End: 2023-11-25

## 2023-11-25 RX ORDER — ONDANSETRON 4 MG/1
4 TABLET, ORALLY DISINTEGRATING ORAL EVERY 4 HOURS PRN
Qty: 10 TABLET | Refills: 0 | Status: SHIPPED | OUTPATIENT
Start: 2023-11-25 | End: 2023-12-02

## 2023-11-25 RX ORDER — SODIUM CHLORIDE 9 MG/ML
1000 INJECTION, SOLUTION INTRAVENOUS ONCE
Status: COMPLETED | OUTPATIENT
Start: 2023-11-25 | End: 2023-11-25

## 2023-11-25 RX ORDER — ALPRAZOLAM 0.25 MG/1
0.25 TABLET ORAL DAILY PRN
Qty: 15 TABLET | Refills: 0 | Status: CANCELLED | OUTPATIENT
Start: 2023-11-25

## 2023-11-25 RX ORDER — FAMOTIDINE 10 MG/ML
20 INJECTION, SOLUTION INTRAVENOUS ONCE
Status: COMPLETED | OUTPATIENT
Start: 2023-11-25 | End: 2023-11-25

## 2023-11-25 RX ORDER — DICYCLOMINE HCL 20 MG
20 TABLET ORAL 4 TIMES DAILY PRN
Qty: 30 TABLET | Refills: 0 | Status: SHIPPED | OUTPATIENT
Start: 2023-11-25 | End: 2023-12-25

## 2023-11-26 RX ORDER — ALPRAZOLAM 0.25 MG/1
0.25 TABLET ORAL
Qty: 15 TABLET | Refills: 0 | Status: SHIPPED | OUTPATIENT
Start: 2023-11-26

## 2023-11-26 NOTE — TELEPHONE ENCOUNTER
Please review. Protocol Failed or has No Protocol.     Requested Prescriptions   Pending Prescriptions Disp Refills    ALPRAZOLAM 0.25 MG Oral Tab [Pharmacy Med Name: ALPRAZOLAM 0.25MG TABLETS] 15 tablet 0     Sig: TAKE 1 TABLET(0.25 MG) BY MOUTH DAILY AS NEEDED       There is no refill protocol information for this order              Recent Outpatient Visits              3 months ago MANDY (generalized anxiety disorder)    [facility], [place] [address], Nasir Marti MD    Telemedicine    6 months ago MANDY (generalized anxiety disorder)    [facility], [place] [address], Nasir Marti MD    Telemedicine    8 months ago Grade IV hemorrhoids    [address], [address],[address], Mel Bonilla MD    Office Visit    8 months ago Thrombosed hemorrhoids    Janelle Metzger MD    Office Visit    10 months ago Rectal bleeding    [address], [place] [address], Dre Mittal MD    Telemedicine

## 2024-01-06 ENCOUNTER — HOSPITAL ENCOUNTER (OUTPATIENT)
Age: 34
Discharge: HOME | End: 2024-01-06
Payer: MEDICAID

## 2024-01-06 ENCOUNTER — TELEMEDICINE (OUTPATIENT)
Dept: INTERNAL MEDICINE CLINIC | Facility: CLINIC | Age: 34
End: 2024-01-06
Payer: MEDICAID

## 2024-01-06 VITALS
TEMPERATURE: 98 F | SYSTOLIC BLOOD PRESSURE: 140 MMHG | OXYGEN SATURATION: 97 % | HEART RATE: 77 BPM | RESPIRATION RATE: 18 BRPM | DIASTOLIC BLOOD PRESSURE: 89 MMHG

## 2024-01-06 DIAGNOSIS — R11.2 NAUSEA VOMITING AND DIARRHEA: Primary | ICD-10-CM

## 2024-01-06 DIAGNOSIS — R19.7 DIARRHEA OF PRESUMED INFECTIOUS ORIGIN: ICD-10-CM

## 2024-01-06 DIAGNOSIS — R19.7 NAUSEA VOMITING AND DIARRHEA: Primary | ICD-10-CM

## 2024-01-06 DIAGNOSIS — R11.2 NAUSEA AND VOMITING, UNSPECIFIED VOMITING TYPE: Primary | ICD-10-CM

## 2024-01-06 LAB — SARS-COV-2 RNA RESP QL NAA+PROBE: NOT DETECTED

## 2024-01-06 RX ORDER — ONDANSETRON 4 MG/1
4 TABLET, ORALLY DISINTEGRATING ORAL EVERY 8 HOURS PRN
Qty: 10 TABLET | Refills: 0 | Status: SHIPPED | OUTPATIENT
Start: 2024-01-06 | End: 2024-01-09

## 2024-01-06 NOTE — ED PROVIDER NOTES
Patient Seen in: [facility]    History     Chief Complaint   Patient presents with    Nausea/Vomiting/Diarrhea     Stated Complaint: nauseous    HPI    Alena Kevin is a 33 year old female who presents with chief complaint of nonbloody diarrhea.  Onset 2 days ago.  Patient reports associated nausea without vomiting.  Patient states she did experience abdominal pain and vomiting, which has resolved.  Patient states she is tolerating oral liquids.  Patient states that her employer requires a note for her to return to work today.  Patient denies fever, chills, constipation, melena, hematochezia, dysuria, hematuria, flank pain, vaginal bleeding, vaginal discharge.            Past Medical History:   Diagnosis Date    Acne     Acne 02/11/2013    Antepartum anemia 02/16/2021    Anxiety     Bipolar disorder (HCC)     Calculus of kidney     ABLE TO PASS OUT    Chlamydia     5/30/2011    Decorative tattoo     10 years ago    Depression     History of pregnancy 2011    SAB    Microhematuria     Pneumonia due to organism     Substance abuse (HCC)        Past Surgical History:   Procedure Laterality Date    ANESTHESIA FOR; VAGINAL DELIVERY ONLY      OTHER  06/2019    Cystography            Family History   Problem Relation Age of Onset    Lipids Father     Hypertension Father     No Known Problems Mother     Stroke Maternal Grandmother         CVA    Hypertension Maternal Grandmother     Heart Disorder Maternal Grandmother     Heart Disorder Maternal Grandfather         CHF    Hypertension Maternal Grandfather     Stroke Maternal Grandfather     Stroke Paternal Grandmother         CVA    Hypertension Paternal Grandmother     Diabetes Paternal Grandmother     Dementia Paternal Grandfather     Lipids Other         Hyperlipidemia    Anxiety Neg     Depression Neg     Bipolar Disorder Neg     Schizophrenia Neg     ADHD Neg     OCD Neg     Substance Abuse Neg     Suicide History Neg        Social History      Socioeconomic History    Marital status: Single   Tobacco Use    Smoking status: Former     Types: Cigarettes     Quit date: 10/12/2020     Years since quitting: 3.2    Smokeless tobacco: Never    Tobacco comments:     social smoker   Vaping Use    Vaping Use: Every day    Substances: Nicotine    Devices: Disposable   Substance and Sexual Activity    Alcohol use: Yes     Comment: socially. last drink 9/15/19    Drug use: Not Currently     Types: Cannabis     Comment: thc oil in vape. last 9/17/19   Other Topics Concern    Caffeine Concern Yes     Comment: 1-3 cups coffee daily       Review of Systems    Positive for stated complaint: nauseous  Other systems are as noted in HPI.  Constitutional and vital signs reviewed.      All other systems reviewed and negative except as noted above.    PSFH elements reviewed from today and agreed except as otherwise stated in HPI.    Physical Exam     ED Triage Vitals [01/06/24 0944]   /89   Pulse 77   Resp 18   Temp 97.6 °F (36.4 °C)   Temp src Temporal   SpO2 97 %   O2 Device None (Room air)       Current:/89   Pulse 77   Temp 97.6 °F (36.4 °C) (Temporal)   Resp 18   LMP 12/21/2023 (Within Days)   SpO2 97%     PULSE OX within normal limits on room air as interpreted by this provider.        Physical Exam    Constitutional: The patient is cooperative. Appears well-developed and well-nourished.  No acute distress.  Psychological: Alert, No abnormalities of mood, affect.  Head: Normocephalic/atraumatic.  Eyes: Pupils are equal round reactive to light.  Conjunctiva are within normal limits.  ENT: Oropharynx is clear.  Mucous membranes moist.  Neck: The neck is supple.  No meningeal signs.  Respiratory: Respiratory effort was normal.  There is no stridor.  Air entry is equal.  Cardiovascular: Regular rate and rhythm.  Capillary refill is brisk.    Gastrointestinal: Abdomen soft, nontender, nondistended.  There is no rebound tenderness or guarding.  No  organomegaly is noted. No peritoneal signs.  Normal bowel sounds.  No McBurney point tenderness.  Negative May sign.  Genitourinary: Not examined.  Lymphatic: No gross lymphadenopathy noted.  Musculoskeletal: Musculoskeletal system is grossly intact.  There is no obvious deformity.  Neurological: Gross motor movement is intact in all 4 extremities.  Patient exhibits normal speech.  Skin: Skin is normal to inspection.  Warm and dry.  No obvious bruising.  No obvious rash.          ED Course     Labs Reviewed   RAPID SARS-COV-2 BY PCR - Normal       MDM     Patient declined further workup and treatment including but not limited to urine dip, blood work, IV fluids, imaging.    Physical exam remained stable as previously documented.  Available results reviewed with patient.    I have given the patient instructions regarding their diagnoses, expectations, follow up, and ER precautions. I explained to the patient that emergent conditions may arise and to go to the ER for new, worsening or any persistent conditions. I've explained the importance of following up with their doctor as instructed. The patient verbalized understanding of the discharge instructions and plan.        Disposition and Plan     Clinical Impression:  1. Nausea vomiting and diarrhea        Disposition:  Discharge    Follow-up:  Naun Damon MD  [address] [place] [address]  [phone]    Call in 1 day  For follow-up      Medications Prescribed:  Current Discharge Medication List

## 2024-01-06 NOTE — ED INITIAL ASSESSMENT (HPI)
Patient reports having nausea, vomiting, and diarrhea that started Thursday about 1 hour after she ate tuna. Patient states she is no longer vomiting, however has some nausea with diarrhea still. Patient was seen by her PCP today and was recommended to come to [facility] to get tested for covid. Patient needs a work note to be able to go back to work since patient works in food/beverage industry.

## 2024-02-18 NOTE — TRIAGE
[facility]      Triage Note    Neelima Najunior Patient Status:  Observation    1990 MRN [id number]   Location [address]  Attending Juancho Jane MD   Hosp Day # 0 PCP Ayleen Parks MD     Gr Decelerations: None            Baseline: 135 BPM           Uterine Irritability: No           Contractions: Not present                                        Acoustic Stimulator: No           Nonstress Test Interpretation: Appropriate for gestationa C/w home mirtazipine 7.5mg qhs Test Value Reference Range Date Time    Blood Type (ABO Group)  O   01/18/21 1646    Rh Factor  Positive   01/18/21 1646    Antibody Screen (Required questions in OE to answer)  Negative   01/18/21 1646    HCT  32.3 % 35.0 - 48.0 01/18/21 1646       39.6 % Negative  Negative 01/20/21 1714          24-28 Weeks     Test Value Reference Range Date Time    HCT  34.7 % 35.0 - 48.0 03/18/21 1322    HGB  11.1 g/dL 12.0 - 16.0 03/18/21 1322    Platelets  633.3 42(8).0 - 450.0 03/18/21 1322    GTT 1 Hr        G • Decorative tattoo     10 years ago   • Depression    • History of pregnancy 2011    SAB   • Microhematuria    • Pneumonia due to organism    • Substance abuse [facility])      Past Social History:   Past Surgical History:   Procedure Laterality Date   • CYST 87 —   03/18/21 1430 143/85 — — 80 —   03/18/21 1415 140/89 — — 85 —   03/18/21 1400 (!) 146/95 — — 82 —   03/18/21 1345 (!) 140/99 — — 86 —   03/18/21 1330 142/90 — — 89 —   03/18/21 1315 143/88 97.9 °F (36.6 °C) Oral 80 16       Constitutional: alert and RDW-SD 41.9 35.1 - 46.3 fL    RDW 13.3 11.0 - 15.0 %    .0 150.0 - 450.0 10(3)uL    Neutrophil Absolute Prelim 6.69 1.50 - 7.70 x10 (3) uL    Neutrophil Absolute 6.69 1.50 - 7.70 x10(3) uL    Lymphocyte Absolute 2.13 1.00 - 4.00 x10(3) uL    Monocyt

## 2024-05-30 DIAGNOSIS — F41.1 GAD (GENERALIZED ANXIETY DISORDER): ICD-10-CM

## 2024-06-03 RX ORDER — ALPRAZOLAM 0.25 MG/1
0.25 TABLET ORAL
Qty: 15 TABLET | Refills: 0 | Status: SHIPPED | OUTPATIENT
Start: 2024-06-03

## 2024-06-03 NOTE — TELEPHONE ENCOUNTER
Please review; protocol failed/No Protocol    Recent Fills: 02/02/2024-quantity 14 for 14 day supply     Last Rx Written: 02/02/2024    Last Office Visit: Telemedicine: 01/06/2024    Requested Prescriptions   Pending Prescriptions Disp Refills    ALPRAZolam 0.25 MG Oral Tab 15 tablet 0     Sig: Take 1 tablet (0.25 mg total) by mouth daily as needed.       Controlled Substance Medication Failed - 5/30/2024 10:14 PM        Failed - This medication is a controlled substance - forward to provider to refill             Recent Outpatient Visits              4 months ago Nausea and vomiting, unspecified vomiting type    [facility], Naun Alegria MD    Telemedicine    9 months ago MANDY (generalized anxiety disorder)    [facility], Naun Alegria MD    Telemedicine    1 year ago MANDY (generalized anxiety disorder)    [facility], Naun Alegria MD    Telemedicine    1 year ago Grade IV hemorrhoids    [facility], Alicja Guerra MD    Office Visit    1 year ago Thrombosed hemorrhoids    [facility] Anabell Medrano MD    Office Visit

## 2024-08-29 DIAGNOSIS — F41.1 GAD (GENERALIZED ANXIETY DISORDER): ICD-10-CM

## 2024-08-31 RX ORDER — ALPRAZOLAM 0.25 MG
0.25 TABLET ORAL
Qty: 15 TABLET | Refills: 0 | OUTPATIENT
Start: 2024-08-31

## 2024-08-31 NOTE — TELEPHONE ENCOUNTER
Please review; protocol failed/no protocol.     Requested Prescriptions   Pending Prescriptions Disp Refills    ALPRAZolam 0.25 MG Oral Tab 15 tablet 0     Sig: Take 1 tablet (0.25 mg total) by mouth daily as needed.       Controlled Substance Medication Failed - 8/29/2024 12:53 PM        Failed - This medication is a controlled substance - forward to provider to refill              Recent Outpatient Visits              7 months ago Nausea and vomiting, unspecified vomiting type    [facility], Naun Alegria MD    Telemedicine    1 year ago MANDY (generalized anxiety disorder)    [facility], Naun Alegria MD    Telemedicine    1 year ago MANDY (generalized anxiety disorder)    [facility], Naun Alegria MD    Telemedicine    1 year ago Grade IV hemorrhoids    [facility], Alicja Guerra MD    Office Visit    1 year ago Thrombosed hemorrhoids    [facility] Anabell Rain MD    Office Visit

## 2024-09-03 RX ORDER — ALPRAZOLAM 0.25 MG
0.25 TABLET ORAL
Qty: 15 TABLET | Refills: 0 | Status: SHIPPED | OUTPATIENT
Start: 2024-09-03

## 2024-12-11 DIAGNOSIS — F41.1 GAD (GENERALIZED ANXIETY DISORDER): ICD-10-CM

## 2024-12-16 NOTE — TELEPHONE ENCOUNTER
Please review; protocol failed/No Protocol    Recent Fills: 09/03/2024    Last Rx Written: 09/03/2024    Last Office Visit: Telemedicine 01/06/2024  Sent [employer] message to patient to schedule an appointment.     Requested Prescriptions   Pending Prescriptions Disp Refills    ALPRAZolam 0.25 MG Oral Tab 15 tablet 0     Sig: Take 1 tablet (0.25 mg total) by mouth daily as needed.       Controlled Substance Medication Failed - 12/16/2024  4:25 PM        Failed - This medication is a controlled substance - forward to provider to refill             Recent Outpatient Visits              11 months ago Nausea and vomiting, unspecified vomiting type    [facility], Naun Alegria MD    Telemedicine    1 year ago MANDY (generalized anxiety disorder)    [facility], Naun Alegria MD    Telemedicine    1 year ago MANDY (generalized anxiety disorder)    [facility], Naun Alegria MD    Telemedicine    1 year ago Grade IV hemorrhoids    [facility], Alicja Guerra MD    Office Visit    1 year ago Thrombosed hemorrhoids    [facility] Anabell Medrano MD    Office Visit

## 2024-12-17 RX ORDER — ALPRAZOLAM 0.25 MG/1
0.25 TABLET ORAL
Qty: 15 TABLET | Refills: 0 | Status: SHIPPED | OUTPATIENT
Start: 2024-12-17

## 2025-01-07 ENCOUNTER — NURSE TRIAGE (OUTPATIENT)
Dept: INTERNAL MEDICINE CLINIC | Facility: CLINIC | Age: 35
End: 2025-01-07

## 2025-01-07 NOTE — TELEPHONE ENCOUNTER
Action Requested: Summary for Provider     []  Critical Lab, Recommendations Needed  [] Need Additional Advice  [x]   FYI    []   Need Orders  [] Need Medications Sent to Pharmacy  []  Other     SUMMARY: Per protocol, patient should be seen in the office today. Appointment scheduled.    Future Appointments   Date Time Provider Department Center   1/7/2025  4:00 PM Janina Judge APRN [facility]   2/4/2025  4:00 PM Naun Damon MD [facility]      Reason for call: Blood Pressure  Onset: 1/6    Patient reports of she was seen at the dentist yesterday for wisdom tooth extraction and filling. They were getting her prepped for the procedure and blood pressure was checked and was told that it was very high. Diastolic BP \"127\", systolic BP \"160?\" Procedure was canceled and was advised to get a clearance form signed by primary care provider.     She denies any symptoms at that time except that she was nervous and had coffee that morning. She rested and rechecked her blood pressure at home last night and it was 151/87, asymptomatic. Advised to schedule an appointment. Also reminded that she is overdue for physical. Both appointments scheduled. Care advice given per protocol. Patient verbalized understanding and agreed with plan of care.     Reason for Disposition   Patient wants to be seen    Protocols used: Blood Pressure - High-A-OH

## 2025-01-27 ENCOUNTER — OFFICE VISIT (OUTPATIENT)
Dept: INTERNAL MEDICINE CLINIC | Facility: CLINIC | Age: 35
End: 2025-01-27

## 2025-01-27 VITALS
BODY MASS INDEX: 29.26 KG/M2 | DIASTOLIC BLOOD PRESSURE: 78 MMHG | HEART RATE: 81 BPM | WEIGHT: 171.38 LBS | SYSTOLIC BLOOD PRESSURE: 128 MMHG | HEIGHT: 64 IN

## 2025-01-27 DIAGNOSIS — Z01.30 BLOOD PRESSURE CHECK: Primary | ICD-10-CM

## 2025-01-27 PROBLEM — Z34.90 PREGNANCY (HCC): Status: RESOLVED | Noted: 2021-03-18 | Resolved: 2025-01-27

## 2025-01-27 PROBLEM — Z01.818 PRE-OP TESTING: Status: RESOLVED | Noted: 2019-03-25 | Resolved: 2025-01-27

## 2025-01-27 PROBLEM — K62.5 ANAL BLEEDING: Status: RESOLVED | Noted: 2019-03-25 | Resolved: 2025-01-27

## 2025-01-27 PROBLEM — F14.10 COCAINE USE DISORDER (HCC): Status: RESOLVED | Noted: 2019-10-16 | Resolved: 2025-01-27

## 2025-01-27 PROBLEM — R35.0 URINARY FREQUENCY: Status: RESOLVED | Noted: 2019-03-25 | Resolved: 2025-01-27

## 2025-01-27 PROCEDURE — 99212 OFFICE O/P EST SF 10 MIN: CPT | Performed by: NURSE PRACTITIONER

## 2025-01-27 RX ORDER — AMLODIPINE BESYLATE 5 MG/1
5 TABLET ORAL DAILY
Qty: 90 TABLET | Refills: 0 | Status: CANCELLED
Start: 2025-01-27

## 2025-01-27 NOTE — PROGRESS NOTES
Subjective:   Alena Kevin is a 34 year old female who presents for Blood Pressure (F/u)     Was seen by me on  for elevated blood pressure readings that precluded her from having desired dental work completed. Was instructed to decrease the amount of caffeinated/energy drinks she consumes, to check her BP at home and to send readings via [employer].  Since that time she has checked her BP at home over 7 days - readings all 150-160 over 100-110 - even after she stopped using caffeine. States after she had continued elevated readings, so she decided to stop using tobacco vape - it has been 4 days since she stopped and she hasn't checked her BP at home. She is not using any drugs except for occasional marijuana.    History/Other:    Chief Complaint Reviewed and Verified  Nursing Notes Reviewed and   Verified  Tobacco Reviewed  Allergies Reviewed  Medications Reviewed    Problem List Reviewed  Medical History Reviewed  Surgical History   Reviewed  Family History Reviewed         Tobacco:  Social History     Tobacco Use   Smoking Status Former    Current packs/day: 0.00    Types: Cigarettes    Quit date: 10/12/2020    Years since quittin.2   Smokeless Tobacco Never   Tobacco Comments    social smoker     E-Cigarettes/Vaping       Questions Responses    E-Cigarette Use Current Every Day User          E-Cigarette/Vaping Substances       Questions Responses    Nicotine Yes          E-Cigarette/Vaping Devices       Questions Responses    Disposable Yes           Tobacco cessation counseling - quit tobacco vape 4d ago..  She smoked tobacco in the past but quit greater than 12 months ago.  Social History     Tobacco Use   Smoking Status Former    Current packs/day: 0.00    Types: Cigarettes    Quit date: 10/12/2020    Years since quittin.2   Smokeless Tobacco Never   Tobacco Comments    social smoker          Current Outpatient Medications   Medication Sig Dispense Refill    ALPRAZolam 0.25 MG Oral Tab  Take 1 tablet (0.25 mg total) by mouth daily as needed. 15 tablet 0         Review of Systems:  Review of Systems  10 point review of systems otherwise negative with the exception of HPI and assessment and plan.    Objective:   /78   Pulse 81   Ht 5' 4\" (1.626 m)   Wt 171 lb 6.4 oz (77.7 kg)   LMP 01/01/2025 (Exact Date)   BMI 29.42 kg/m²  Estimated body mass index is 29.42 kg/m² as calculated from the following:    Height as of this encounter: 5' 4\" (1.626 m).    Weight as of this encounter: 171 lb 6.4 oz (77.7 kg).      Physical Exam  Vitals reviewed.   Cardiovascular:      Rate and Rhythm: Normal rate.   Pulmonary:      Effort: Pulmonary effort is normal.   Neurological:      Mental Status: She is alert.         Assessment & Plan:   1. Blood pressure check (Primary)  -Blood pressure in office x 2 readings today were within desired range. Congratulated her on quitting vaping, encouraged her to continue not to vape.  Encouraged her to check her blood pressure at home a couple times over the next week.  -Continue with decreased caffeine take.  -Has follow-up for her annual physical with her primary physician next week Tuesday, she will bring her dental clearance form to that visit for his completion provided her blood pressure remains within goal range.        Return for annual physical next week as scheduled..    EUSEBIO Emery, 1/27/2025, 3:59 PM     This note was prepared using Dragon Medical voice recognition dictation software. As a result errors may occur. When identified, these errors have been corrected. While every attempt is made to correct errors during dictation discrepancies may still exist.

## 2025-03-04 DIAGNOSIS — F41.1 GAD (GENERALIZED ANXIETY DISORDER): ICD-10-CM

## 2025-03-06 DIAGNOSIS — F41.1 GAD (GENERALIZED ANXIETY DISORDER): ICD-10-CM

## 2025-03-07 RX ORDER — ALPRAZOLAM 0.25 MG
0.25 TABLET ORAL
Qty: 15 TABLET | Refills: 0 | OUTPATIENT
Start: 2025-03-07

## 2025-03-07 RX ORDER — ALPRAZOLAM 0.25 MG
0.25 TABLET ORAL
Qty: 15 TABLET | Refills: 0 | Status: SHIPPED | OUTPATIENT
Start: 2025-03-07

## 2025-03-07 NOTE — TELEPHONE ENCOUNTER
Please review; protocol failed/ has no protocol      Recent fills: 12/17/2024  Last Rx written: 12/17/2024  Last Office Visit: 01/27/2025 with Janina Judge      Future Appointments  Date Type Provider Dept   04/11/25 Appointment Naun Damon MD [facility]

## 2025-03-27 ENCOUNTER — NURSE TRIAGE (OUTPATIENT)
Dept: INTERNAL MEDICINE CLINIC | Facility: CLINIC | Age: 35
End: 2025-03-27

## 2025-03-27 ENCOUNTER — HOSPITAL ENCOUNTER (OUTPATIENT)
Age: 35
Discharge: HOME | End: 2025-03-27
Payer: MEDICAID

## 2025-03-27 VITALS
DIASTOLIC BLOOD PRESSURE: 113 MMHG | SYSTOLIC BLOOD PRESSURE: 152 MMHG | TEMPERATURE: 98 F | RESPIRATION RATE: 18 BRPM | HEART RATE: 81 BPM | OXYGEN SATURATION: 100 %

## 2025-03-27 DIAGNOSIS — I10 HYPERTENSION, UNSPECIFIED TYPE: Primary | ICD-10-CM

## 2025-03-27 PROCEDURE — 99213 OFFICE O/P EST LOW 20 MIN: CPT

## 2025-03-27 RX ORDER — AMLODIPINE BESYLATE 5 MG/1
5 TABLET ORAL DAILY
Qty: 30 TABLET | Refills: 0 | Status: SHIPPED | OUTPATIENT
Start: 2025-03-27

## 2025-03-27 NOTE — ED INITIAL ASSESSMENT (HPI)
Measuring blood pressure, has been elevated since last night. Highest BP so far 170/130. Reports some fatigue. Denies headache, denies vision changes.

## 2025-03-27 NOTE — ED PROVIDER NOTES
Patient Seen in: [facility]      History     Chief Complaint   Patient presents with    Blood Pressure     Stated Complaint: High Blood Pressure  Subjective:   Alena is a 35-year-old female presenting to the immediate care concerned about her blood pressure.  Patient states that she took her blood pressure yesterday after visiting her brother-in-law in the hospital and her blood pressure was elevated.  Patient states that she does have history of anxiety and has been feeling more anxious lately than normal.  Patient has been seen for elevated blood pressure in the past, most recently in January.  At that time the patient stopped vaping, cut down on her caffeine intake and stopped using energy drinks and her blood pressure had improved by the time she followed up with PCP.  Patient states that she has started vaping again, drinks a lot of caffeine and has had a few energy drinks over the past couple of days.  Patient states she does exercise a few times a week.  States that she walks and tolerates the activity well.  Patient states that she has not had any headache, dizziness, blurred vision, syncope, weakness, chest pain, shortness of breath.  She is otherwise feeling well.  She is eating and drinking well and is well-hydrated.  She denies any other concerns or complaints.        Objective:   Past Medical History:    Acne    Acne    Anal bleeding    Antepartum anemia ([facility])    Anxiety    Bipolar disorder ([facility])    Calculus of kidney    ABLE TO PASS OUT    Chlamydia    5/30/2011    Cocaine use disorder ([facility])    Decorative tattoo    10 years ago    Depression    Female pelvic pain    History of pregnancy    SAB    Microhematuria    Pneumonia due to organism    Pre-op testing    Pregnancy (HCC)    Substance abuse ([facility])    Urinary frequency            Past Surgical History:   Procedure Laterality Date    Anesthesia for; vaginal delivery only      Other  06/2019    Cystography              Social History      Socioeconomic History    Marital status: Single   Tobacco Use    Smoking status: Former     Current packs/day: 0.00     Types: Cigarettes     Quit date: 10/12/2020     Years since quittin.4    Smokeless tobacco: Never    Tobacco comments:     social smoker   Vaping Use    Vaping status: Former    Quit date: 2025   Substance and Sexual Activity    Alcohol use: Yes     Comment: socially. last drink 9/15/19    Drug use: Not Currently     Types: Cannabis     Comment: thc oil in vape. last 19    Sexual activity: Yes     Partners: Male     Birth control/protection: Condom   Other Topics Concern    Caffeine Concern Yes     Comment: 1-3 cups coffee daily            Review of Systems    Positive for stated complaint: Blood Pressure    Other systems are as noted in HPI.  Constitutional and vital signs reviewed.      All other systems reviewed and negative except as noted above.    Physical Exam     ED Triage Vitals [25 1302]   BP (!) 171/110   Pulse 82   Resp 18   Temp 98 °F (36.7 °C)   Temp src Oral   SpO2 99 %   O2 Device None (Room air)     Current:BP (!) 152/113   Pulse 81   Temp 98 °F (36.7 °C) (Oral)   Resp 18   LMP 2025 (Exact Date)   SpO2 100%     Physical Exam  Vitals and nursing note reviewed.   Constitutional:       General: She is not in acute distress.     Appearance: Normal appearance. She is not ill-appearing, toxic-appearing or diaphoretic.   HENT:      Head: Normocephalic.   Cardiovascular:      Rate and Rhythm: Normal rate and regular rhythm.      Pulses: Normal pulses.      Heart sounds: Normal heart sounds.   Pulmonary:      Effort: Pulmonary effort is normal. No tachypnea, bradypnea, accessory muscle usage, prolonged expiration, respiratory distress or retractions.      Breath sounds: Normal breath sounds and air entry. No stridor, decreased air movement or transmitted upper airway sounds. No decreased breath sounds, wheezing, rhonchi or rales.   Abdominal:      General:  Abdomen is flat.   Musculoskeletal:         General: Normal range of motion.      Cervical back: Normal range of motion.   Skin:     General: Skin is warm.      Capillary Refill: Capillary refill takes less than 2 seconds.   Neurological:      General: No focal deficit present.      Mental Status: She is alert and oriented to person, place, and time.      GCS: GCS eye subscore is 4. GCS verbal subscore is 5. GCS motor subscore is 6.      Cranial Nerves: No dysarthria or facial asymmetry.      Motor: No weakness, tremor or abnormal muscle tone.      Coordination: Coordination normal.      Gait: Gait normal.   Psychiatric:         Mood and Affect: Mood normal.         Behavior: Behavior normal.         Thought Content: Thought content normal.         Judgment: Judgment normal.         ED Course   Radiology:    No orders to display     Labs Reviewed - No data to display    MDM     Medical Decision Making  Differential diagnoses reflecting the complexity of care include but are not limited to hypertension, high blood pressure reading without hypertension, anxiety, less likely ACS or acute neurologic issue.    Comorbidities that add complexity to management include: None  History obtained by an independent source was from: Patient  Discussions of management was done with: Dr. Damon  Patient is well appearing, non-toxic and in no acute distress.  Vital signs are stable.     35-year-old female with history of high blood pressure readings in the past presents to the immediate care for high blood pressure reading today at home.  In January 2025 patient had a high blood pressure reading at the dental office did a PCP visit for high blood pressure evaluation.  At that time in the office her blood pressure was normal.  Discussed with PCP stopping vaping, cutting back on caffeine and energy drinks which she did and has been monitoring her blood pressure since which has improved.  States that she has gone back to vaping, has  increased her caffeine again and has had a few energy drinks over the past couple of days and noted a high blood pressure reading today.  Patient also states that she has had some increased anxiety over the past few days.  No red flag hypertension symptoms today.  Reached out to patient's PCP to see if they would like to start her on any antihypertensive medications.  Spoke to Dr. Damon who recommended starting amlodipine 5 mg daily and follow-up with him in the office within the next week or 2.  Prescription for amlodipine sent.  Discussed with the patient that she should try to cut out vaping, caffeine and energy drinks.  Recommended that if the patient does develop any headache, dizziness, blurred vision, syncope, chest pain, shortness of breath or any other worsening or concerning complaints that she go to the emergency department.  Otherwise recommended following up with primary care provider.    ED precautions discussed.  Patient (guardian) advised to follow up with PCP in 2-3 days.  Patient (guardian) agrees with this plan of care.  Patient (guardian) verbalizes understanding of discharge instructions and plan of care.      Risk  Prescription drug management.        Disposition and Plan     Clinical Impression:  1. Hypertension, unspecified type         Disposition:  Discharge  3/27/2025  1:50 pm    Follow-up:  Naun Damon MD  [address] [place] [address]  [phone]    Schedule an appointment as soon as possible for a visit in 1 week            Medications Prescribed:  Discharge Medication List as of 3/27/2025  1:52 PM        START taking these medications    Details   amLODIPine 5 MG Oral Tab Take 1 tablet (5 mg total) by mouth daily., Normal, Disp-30 tablet, R-0

## 2025-03-27 NOTE — DISCHARGE INSTRUCTIONS
With Dr. Damon who recommended we start you on an antihypertension medication today.  Sent you a prescription for amlodipine that should be taken daily.    Please make an appointment to follow-up with Dr. Damon within the next 1 to 2 weeks.    Please be sure that you continue to monitor your blood pressure at home 1-2 times per week.  Please try to cut out caffeine, energy drinks and vaping.    If you develop any headache, dizziness, blurred vision, passing out, chest pain, shortness of breath or any other worsening or concerning complaints you should go to the emergency department.  Otherwise be sure to follow-up with your primary care provider.

## 2025-03-27 NOTE — TELEPHONE ENCOUNTER
Action Requested: Summary for Provider     []  Critical Lab, Recommendations Needed  [] Need Additional Advice  []   FYI    []   Need Orders  [] Need Medications Sent to Pharmacy  []  Other     SUMMARY: Per protocol appointment advised    Reason for call: Blood Pressure    Patient calling (verified full name and date of birth).  Patient stated she just checked her blood pressure at is is  177/130. Denied any chest pain, shortness of breath or blurred vision. Does have \" some headache\"   Patient stated had elevated blood pressure first in 1-2025 and saw Dr. Damon and APN. Patient felt it is related to anxiety and vaping    Advised to seek UC evaluation now and will do so    Care Advice    Patient/Caregiver understands and will follow care advice?: Yes, plans to follow advice           Blood Pressure - [facility]  Rosalba DANIELLA Thu Mar 27, 2025 11:52 AM      Disposition and First Aid       GO TO OFFICE NOW:  * You need to be examined. Come into the office right now.   * IF NO AVAILABLE APPOINTMENTS:  You need to be seen in an Urgent Care Center. Go to the one at [place]. Leave now. A nearby Urgent Care Center is often a good source of care. Another choice is to go to the Emergency Department.                              Reason for Disposition   Systolic BP >= 200 OR Diastolic >= 120 and having NO cardiac or neurologic symptoms    Protocols used: Blood Pressure - [facility]

## 2025-03-31 ENCOUNTER — OFFICE VISIT (OUTPATIENT)
Dept: OBGYN CLINIC | Facility: CLINIC | Age: 35
End: 2025-03-31
Payer: MEDICAID

## 2025-03-31 VITALS
DIASTOLIC BLOOD PRESSURE: 106 MMHG | WEIGHT: 171 LBS | SYSTOLIC BLOOD PRESSURE: 163 MMHG | BODY MASS INDEX: 29 KG/M2 | HEART RATE: 74 BPM

## 2025-03-31 DIAGNOSIS — N76.0 VAGINITIS AND VULVOVAGINITIS: Primary | ICD-10-CM

## 2025-03-31 PROCEDURE — 99213 OFFICE O/P EST LOW 20 MIN: CPT | Performed by: STUDENT IN AN ORGANIZED HEALTH CARE EDUCATION/TRAINING PROGRAM

## 2025-03-31 NOTE — PROGRESS NOTES
[facility]  Obstetrics and Gynecology  Gyne Problem Visit      Alena Kevin is a 35 year old female  presenting with concerns of thick white vaginal discharge with itching for the last two weeks. Has tried OTC 1-day Monitstat. Symptoms resolved only temporarily. No odor or bleeding. No vulvar lesions. No recent abx use. No new sexual partners. Would like STD screening.     Patient's last menstrual period was 2025 (exact date).     Pap:   Contraception:none    OBSTETRICS HISTORY:  OB History    Para Term  AB Living   4 1 1 0 3 1   SAB IAB Ectopic Multiple Live Births   1 2 0 0 1       GYNE HISTORY:      No data recorded      Latest Ref Rng & Units 2021     5:13 PM 7/10/2017     7:25 PM   RECENT PAP RESULTS   INTERPRETATION/RESULT: Negative for intraepithelial lesion or malignancy Negative for intraepithelial lesion or malignancy  Negative for intraepithelial lesion or malignancy    HPV Negative Negative  Negative          History   Sexual Activity    Sexual activity: Yes    Partners: Male    Birth control/ protection: Condom       MEDICAL HISTORY:  Past Medical History:   Diagnosis Date    Acne     Acne 2013    Anal bleeding 2019    Antepartum anemia (HCC) 2021    Anxiety     Bipolar disorder ([facility])     Calculus of kidney     ABLE TO PASS OUT    Chlamydia     2011    Cocaine use disorder ([facility]) 10/16/2019    Decorative tattoo     10 years ago    Depression     Female pelvic pain     History of pregnancy     SAB    Microhematuria     Pneumonia due to organism     Pre-op testing 2019    Pregnancy (HCC) 2021    Substance abuse ([facility])     Urinary frequency 2019     Past Surgical History:   Procedure Laterality Date    Anesthesia for; vaginal delivery only      Other  2019    Cystography       SOCIAL HISTORY:  Social History     Socioeconomic History    Marital status: Single     Spouse name: Not on file    Number of children: Not on file     Years of education: Not on file    Highest education level: Not on file   Occupational History    Not on file   Tobacco Use    Smoking status: Former     Current packs/day: 0.00     Types: Cigarettes     Quit date: 10/12/2020     Years since quittin.4    Smokeless tobacco: Never    Tobacco comments:     social smoker   Vaping Use    Vaping status: Former    Quit date: 2025   Substance and Sexual Activity    Alcohol use: Yes     Comment: socially. last drink 9/15/19    Drug use: Not Currently     Types: Cannabis     Comment: thc oil in vape. last 19    Sexual activity: Yes     Partners: Male     Birth control/protection: Condom   Other Topics Concern     Service Not Asked    Blood Transfusions Not Asked    Caffeine Concern Yes     Comment: 1-3 cups coffee daily    Occupational Exposure Not Asked    Hobby Hazards Not Asked    Sleep Concern Not Asked    Stress Concern Not Asked    Weight Concern Not Asked    Special Diet Not Asked    Back Care Not Asked    Exercise Not Asked    Bike Helmet Not Asked    Seat Belt Not Asked    Self-Exams Not Asked    Grew up on a farm Not Asked    History of tanning Not Asked    Outdoor occupation Not Asked    Breast feeding Not Asked    Reaction to local anesthetic Not Asked   Social History Narrative    Not on file     Social Drivers of Health     Food Insecurity: Not on file   Transportation Needs: Not on file   Stress: Not on file   Housing Stability: Not on file       MEDICATIONS:    Current Outpatient Medications:     amLODIPine 5 MG Oral Tab, Take 1 tablet (5 mg total) by mouth daily., Disp: 30 tablet, Rfl: 0    ALPRAZolam 0.25 MG Oral Tab, Take 1 tablet (0.25 mg total) by mouth daily as needed., Disp: 15 tablet, Rfl: 0    ALLERGIES:  Allergies[1]      REVIEW OF SYSTEMS:  Review of Systems   Constitutional:  Negative for chills, fever and unexpected weight change.   Respiratory: Negative.     Cardiovascular: Negative.    Gastrointestinal:  Negative for  abdominal pain, constipation, diarrhea and nausea.   Genitourinary:  Negative for dyspareunia, dysuria, genital sores, hematuria, menstrual problem, pelvic pain, vaginal bleeding, vaginal discharge and vaginal pain.   Musculoskeletal: Negative.    Skin: Negative.    Neurological: Negative.    Hematological: Negative.    Psychiatric/Behavioral: Negative.         PHYSICAL EXAM:  BP (!) 163/106   Pulse 74   Wt 171 lb (77.6 kg)   LMP 03/22/2025 (Exact Date)   BMI 29.35 kg/m²     Chaperone offered, pt declined.    GENERAL: well developed, well nourished, in no apparent distress  ABDOMEN: Soft, non distended; non tender, no masses  GYNE/: External Genitalia: Normal appearing, no lesions. Urethral meatus appear wnl, no abnormal discharge or lesions noted.          Bladder: well supported, urethra wnl, no lesions or fissures                     Vagina: normal pink mucosa, no lesions, normal clear discharge.                      Uterus: mobile, non tender, normal size                     Cervix: Normal                      Adnexa: non tender, no masses, normal size     ASSESSMENT:       ICD-10-CM    1. Vaginitis and vulvovaginitis  N76.0 Vaginitis Vaginosis PCR Panel          Plan:  - Vaginal cultures, will treat pending results. Vaginal hygiene discussed.  - BP noted to be elevated at today's visit, patient currently on BP medication which she has not yet taken today. Advised to monitor at home      JOHN TAPIA PA-C  10:29 AM  3/31/2025        Spent total time 20 minutes on obtaining history / chart review, evaluating patient / performing medically appropriate exam, discussing treatment options, counseling / educating, and completing documentation, coordinating care.         [1] No Known Allergies

## 2025-04-01 LAB
BV BACTERIA DNA VAG QL NAA+PROBE: POSITIVE
C GLABRATA DNA VAG QL NAA+PROBE: NEGATIVE
C KRUSEI DNA VAG QL NAA+PROBE: NEGATIVE
C TRACH DNA SPEC QL NAA+PROBE: NEGATIVE
CANDIDA DNA VAG QL NAA+PROBE: NEGATIVE
N GONORRHOEA DNA SPEC QL NAA+PROBE: NEGATIVE
T VAGINALIS DNA VAG QL NAA+PROBE: NEGATIVE

## 2025-04-01 RX ORDER — METRONIDAZOLE 500 MG/1
500 TABLET ORAL 2 TIMES DAILY
Qty: 14 TABLET | Refills: 0 | Status: SHIPPED | OUTPATIENT
Start: 2025-04-01 | End: 2025-04-08

## 2025-05-19 DIAGNOSIS — F41.1 GAD (GENERALIZED ANXIETY DISORDER): ICD-10-CM

## 2025-05-21 RX ORDER — ALPRAZOLAM 0.25 MG
0.25 TABLET ORAL
Qty: 15 TABLET | Refills: 0 | Status: SHIPPED | OUTPATIENT
Start: 2025-05-21

## 2025-05-21 NOTE — TELEPHONE ENCOUNTER
Please review; protocol failed/No Protocol      Recent Fills: 12/17/2024, 03/08/2025    Last Rx Written: 03/07/2025    Last Office Visit: 01/27/2025  Future Appointments   Date Time Provider Department Center   6/3/2025  1:15 PM Naun Damon MD [facility]

## 2025-06-03 ENCOUNTER — OFFICE VISIT (OUTPATIENT)
Dept: INTERNAL MEDICINE CLINIC | Facility: CLINIC | Age: 35
End: 2025-06-03
Payer: MEDICAID

## 2025-06-03 VITALS
DIASTOLIC BLOOD PRESSURE: 88 MMHG | HEART RATE: 81 BPM | BODY MASS INDEX: 28.24 KG/M2 | HEIGHT: 64 IN | WEIGHT: 165.38 LBS | SYSTOLIC BLOOD PRESSURE: 128 MMHG

## 2025-06-03 DIAGNOSIS — I10 PRIMARY HYPERTENSION: ICD-10-CM

## 2025-06-03 DIAGNOSIS — F41.1 GAD (GENERALIZED ANXIETY DISORDER): ICD-10-CM

## 2025-06-03 DIAGNOSIS — Z00.00 ANNUAL PHYSICAL EXAM: Primary | ICD-10-CM

## 2025-06-03 DIAGNOSIS — I83.90 VARICOSE VEINS: ICD-10-CM

## 2025-06-03 DIAGNOSIS — Z12.4 CERVICAL CANCER SCREENING: ICD-10-CM

## 2025-06-03 PROCEDURE — 99395 PREV VISIT EST AGE 18-39: CPT | Performed by: INTERNAL MEDICINE

## 2025-06-03 RX ORDER — AMLODIPINE BESYLATE 5 MG/1
5 TABLET ORAL DAILY
Qty: 90 TABLET | Refills: 1 | Status: SHIPPED | OUTPATIENT
Start: 2025-06-03

## 2025-06-03 NOTE — PROGRESS NOTES
Subjective:     Patient ID: Alena Kevin is a 35 year old female.    Pt presents today for her annual physical as well as ffup of hypertension .    Hypertension  This is a new problem. The current episode started more than 1 month ago. The problem has been gradually improving since onset. Pertinent negatives include no chest pain, palpitations, peripheral edema or shortness of breath. There are no associated agents to hypertension. There are no known risk factors for coronary artery disease. Past treatments include calcium channel blockers. There are no compliance problems.  There is no history of angina, kidney disease, CAD/MI, CVA, heart failure or PVD. There is no history of chronic renal disease, a hypertension causing med or a thyroid problem.       History/Other:   Review of Systems   Constitutional: Negative.    HENT: Negative.     Eyes: Negative.    Respiratory: Negative.  Negative for shortness of breath.    Cardiovascular: Negative.  Negative for chest pain, palpitations and leg swelling.        Exercise by walkiing 4miles daily   Gastrointestinal: Negative.    Allergic/Immunologic: Negative for environmental allergies, food allergies and immunocompromised state.   Neurological:  Negative for syncope.   Hematological: Negative.      Current Medications[1]  Allergies:Allergies[2]    Past Medical History[3]   Past Surgical History[4]   Family History[5]   Social History: Short Social Hx on File[6]     Objective:   Physical Exam  Constitutional:       General: She is not in acute distress.     Appearance: She is well-developed. She is not ill-appearing, toxic-appearing or diaphoretic.   HENT:      Head: Normocephalic and atraumatic.      Right Ear: External ear normal.      Left Ear: External ear normal.      Nose: Nose normal.      Mouth/Throat:      Pharynx: No oropharyngeal exudate.   Eyes:      General:         Right eye: No discharge.         Left eye: No discharge.      Conjunctiva/sclera:  Conjunctivae normal.      Pupils: Pupils are equal, round, and reactive to light.   Neck:      Vascular: No JVD.   Cardiovascular:      Rate and Rhythm: Normal rate and regular rhythm.      Heart sounds: Normal heart sounds. No murmur heard.  Pulmonary:      Effort: Pulmonary effort is normal. No respiratory distress.      Breath sounds: Normal breath sounds. No wheezing or rales.   Abdominal:      General: Bowel sounds are normal. There is no distension.      Palpations: Abdomen is soft. There is no mass.      Tenderness: There is no abdominal tenderness. There is no guarding or rebound.   Musculoskeletal:         General: No tenderness. Normal range of motion.      Cervical back: Normal range of motion and neck supple. No rigidity or tenderness.      Right lower leg: No edema.      Left lower leg: No edema.      Comments: Varicose veins on the left thigh/knee   Lymphadenopathy:      Cervical: No cervical adenopathy.   Skin:     General: Skin is warm and dry.      Coloration: Skin is not jaundiced or pale.      Findings: No rash.   Neurological:      Mental Status: She is alert and oriented to person, place, and time.         Assessment & Plan:   (Z00.00) Annual physical exam  (primary encounter diagnosis)  Plan: CBC With Differential With Platelet, Comp         Metabolic Panel (14), Hemoglobin A1C, Lipid         Panel, TSH W Reflex To Free T4, Urinalysis,         Routine [E]        Routine labs ordered . Pt declined covid booster.     (F41.1) MANDY (generalized anxiety disorder)  Plan: stable on prn xanax.     (I10) Primary hypertension  Plan: DBP elevated, has ran out of bp med for a week so refilled. Pt to rtc in 4 weeks for recheck bp .    (Z12.4) Cervical cancer screening  Plan: pt told to ffup with her gyne for her papsmear.     (I83.90) Varicose veins  Plan: Interventional Radiology - [facility]        Refer pt to IR.        No orders of the defined types were placed in this encounter.      Meds This  Visit:  Requested Prescriptions     Pending Prescriptions Disp Refills    amLODIPine 5 MG Oral Tab 30 tablet 0     Sig: Take 1 tablet (5 mg total) by mouth daily.       Imaging & Referrals:  None            [1]   Current Outpatient Medications   Medication Sig Dispense Refill    ALPRAZolam 0.25 MG Oral Tab Take 1 tablet (0.25 mg total) by mouth daily as needed. 15 tablet 0    amLODIPine 5 MG Oral Tab Take 1 tablet (5 mg total) by mouth daily. 30 tablet 0   [2] No Known Allergies  [3]   Past Medical History:   Acne    Acne    Anal bleeding    Antepartum anemia ([facility])    Anxiety    Bipolar disorder ([facility])    Calculus of kidney    ABLE TO PASS OUT    Chlamydia    5/30/2011    Cocaine use disorder ([facility])    Decorative tattoo    10 years ago    Depression    Female pelvic pain    History of pregnancy    SAB    Microhematuria    Pneumonia due to organism    Pre-op testing    Pregnancy ([facility])    Substance abuse ([facility])    Urinary frequency   [4]   Past Surgical History:  Procedure Laterality Date    Anesthesia for; vaginal delivery only      Other  06/2019    Cystography   [5]   Family History  Problem Relation Age of Onset    Lipids Father     Hypertension Father     No Known Problems Mother     Stroke Maternal Grandmother         CVA    Hypertension Maternal Grandmother     Heart Disorder Maternal Grandmother     Heart Disorder Maternal Grandfather         CHF    Hypertension Maternal Grandfather     Stroke Maternal Grandfather     Stroke Paternal Grandmother         CVA    Hypertension Paternal Grandmother     Diabetes Paternal Grandmother     Dementia Paternal Grandfather     Lipids Other         Hyperlipidemia    Anxiety Neg     Depression Neg     Bipolar Disorder Neg     Schizophrenia Neg     ADHD Neg     OCD Neg     Substance Abuse Neg     Suicide History Neg    [6]   Social History  Socioeconomic History    Marital status: Single   Tobacco Use    Smoking status: Former     Current packs/day: 0.00     Types: Cigarettes      Quit date: 10/12/2020     Years since quittin.6    Smokeless tobacco: Current    Tobacco comments:     social smoker   Vaping Use    Vaping status: Some Days    Last attempt to quit: 2025    Substances: Nicotine   Substance and Sexual Activity    Alcohol use: Yes     Comment: socially. last drink 9/15/19    Drug use: Not Currently     Types: Cannabis     Comment: thc oil in vape. last 19    Sexual activity: Yes     Partners: Male     Birth control/protection: Condom   Other Topics Concern    Caffeine Concern Yes     Comment: 1-3 cups coffee daily

## 2025-07-08 DIAGNOSIS — F41.1 GAD (GENERALIZED ANXIETY DISORDER): ICD-10-CM

## 2025-07-10 RX ORDER — ALPRAZOLAM 0.25 MG
0.25 TABLET ORAL
Qty: 15 TABLET | Refills: 0 | Status: SHIPPED | OUTPATIENT
Start: 2025-07-10

## 2025-07-10 NOTE — TELEPHONE ENCOUNTER
05/21/2025  LAST WRITTEN: 05/21/2025  Quantity: 14    Recent Visits  Date Type Provider Dept   06/03/25 Office Visit Naun Damon MD [facility]-Internal Med

## 2025-08-15 ENCOUNTER — HOSPITAL ENCOUNTER (EMERGENCY)
Facility: HOSPITAL | Age: 35
Discharge: HOME | End: 2025-08-15
Attending: EMERGENCY MEDICINE

## 2025-08-15 ENCOUNTER — NURSE TRIAGE (OUTPATIENT)
Dept: INTERNAL MEDICINE CLINIC | Facility: CLINIC | Age: 35
End: 2025-08-15

## 2025-08-15 VITALS
HEART RATE: 68 BPM | RESPIRATION RATE: 18 BRPM | TEMPERATURE: 98 F | SYSTOLIC BLOOD PRESSURE: 136 MMHG | WEIGHT: 160 LBS | DIASTOLIC BLOOD PRESSURE: 102 MMHG | HEIGHT: 64 IN | OXYGEN SATURATION: 99 % | BODY MASS INDEX: 27.31 KG/M2

## 2025-08-15 DIAGNOSIS — I10 ACCELERATED HYPERTENSION: Primary | ICD-10-CM

## 2025-08-15 LAB
ALBUMIN SERPL-MCNC: 4.6 G/DL (ref 3.2–4.8)
ALBUMIN/GLOB SERPL: 1.8 (ref 1–2)
ALP LIVER SERPL-CCNC: 56 U/L (ref 37–98)
ALT SERPL-CCNC: 11 U/L (ref 10–49)
ANION GAP SERPL CALC-SCNC: 9 MMOL/L (ref 0–18)
AST SERPL-CCNC: 15 U/L (ref ?–34)
ATRIAL RATE: 67 BPM
BASOPHILS # BLD AUTO: 0.04 X10(3) UL (ref 0–0.2)
BASOPHILS NFR BLD AUTO: 0.6 %
BILIRUB SERPL-MCNC: 0.6 MG/DL (ref 0.3–1.2)
BUN BLD-MCNC: 9 MG/DL (ref 9–23)
BUN/CREAT SERPL: 10.7 (ref 10–20)
CALCIUM BLD-MCNC: 9.5 MG/DL (ref 8.7–10.4)
CHLORIDE SERPL-SCNC: 107 MMOL/L (ref 98–112)
CO2 SERPL-SCNC: 24 MMOL/L (ref 21–32)
CREAT BLD-MCNC: 0.84 MG/DL (ref 0.55–1.02)
DEPRECATED RDW RBC AUTO: 41.1 FL (ref 35.1–46.3)
EGFRCR SERPLBLD CKD-EPI 2021: 93 ML/MIN/1.73M2 (ref 60–?)
EOSINOPHIL # BLD AUTO: 0.24 X10(3) UL (ref 0–0.7)
EOSINOPHIL NFR BLD AUTO: 3.4 %
ERYTHROCYTE [DISTWIDTH] IN BLOOD BY AUTOMATED COUNT: 14.1 % (ref 11–15)
GLOBULIN PLAS-MCNC: 2.6 G/DL (ref 2–3.5)
GLUCOSE BLD-MCNC: 118 MG/DL (ref 70–99)
HCT VFR BLD AUTO: 41.4 % (ref 35–48)
HGB BLD-MCNC: 13.7 G/DL (ref 12–16)
IMM GRANULOCYTES # BLD AUTO: 0.02 X10(3) UL (ref 0–1)
IMM GRANULOCYTES NFR BLD: 0.3 %
LYMPHOCYTES # BLD AUTO: 2.24 X10(3) UL (ref 1–4)
LYMPHOCYTES NFR BLD AUTO: 31.5 %
MAGNESIUM SERPL-MCNC: 1.9 MG/DL (ref 1.6–2.6)
MCH RBC QN AUTO: 27 PG (ref 26–34)
MCHC RBC AUTO-ENTMCNC: 33.1 G/DL (ref 31–37)
MCV RBC AUTO: 81.7 FL (ref 80–100)
MONOCYTES # BLD AUTO: 0.53 X10(3) UL (ref 0.1–1)
MONOCYTES NFR BLD AUTO: 7.4 %
NEUTROPHILS # BLD AUTO: 4.05 X10 (3) UL (ref 1.5–7.7)
NEUTROPHILS # BLD AUTO: 4.05 X10(3) UL (ref 1.5–7.7)
NEUTROPHILS NFR BLD AUTO: 56.8 %
OSMOLALITY SERPL CALC.SUM OF ELEC: 290 MOSM/KG (ref 275–295)
P AXIS: 54 DEGREES
P-R INTERVAL: 136 MS
PLATELET # BLD AUTO: 265 10(3)UL (ref 150–450)
POTASSIUM SERPL-SCNC: 3.4 MMOL/L (ref 3.5–5.1)
PROT SERPL-MCNC: 7.2 G/DL (ref 5.7–8.2)
Q-T INTERVAL: 392 MS
QRS DURATION: 94 MS
QTC CALCULATION (BEZET): 414 MS
R AXIS: 70 DEGREES
RBC # BLD AUTO: 5.07 X10(6)UL (ref 3.8–5.3)
SODIUM SERPL-SCNC: 140 MMOL/L (ref 136–145)
T AXIS: 6 DEGREES
TSI SER-ACNC: 0.81 UIU/ML (ref 0.55–4.78)
VENTRICULAR RATE: 67 BPM
WBC # BLD AUTO: 7.1 X10(3) UL (ref 4–11)

## 2025-08-15 PROCEDURE — 36415 COLL VENOUS BLD VENIPUNCTURE: CPT

## 2025-08-15 PROCEDURE — 93010 ELECTROCARDIOGRAM REPORT: CPT

## 2025-08-15 PROCEDURE — 99284 EMERGENCY DEPT VISIT MOD MDM: CPT

## 2025-08-15 PROCEDURE — 93005 ELECTROCARDIOGRAM TRACING: CPT

## 2025-08-15 PROCEDURE — 80053 COMPREHEN METABOLIC PANEL: CPT | Performed by: EMERGENCY MEDICINE

## 2025-08-15 PROCEDURE — 85025 COMPLETE CBC W/AUTO DIFF WBC: CPT | Performed by: EMERGENCY MEDICINE

## 2025-08-15 PROCEDURE — 83735 ASSAY OF MAGNESIUM: CPT | Performed by: EMERGENCY MEDICINE

## 2025-08-15 PROCEDURE — 84443 ASSAY THYROID STIM HORMONE: CPT | Performed by: EMERGENCY MEDICINE

## 2025-08-15 RX ORDER — LISINOPRIL 20 MG/1
20 TABLET ORAL DAILY
Qty: 30 TABLET | Refills: 0 | Status: SHIPPED | OUTPATIENT
Start: 2025-08-16 | End: 2025-09-15

## 2025-08-15 RX ORDER — LISINOPRIL 5 MG/1
20 TABLET ORAL ONCE
Status: COMPLETED | OUTPATIENT
Start: 2025-08-15 | End: 2025-08-15

## 2025-08-20 DIAGNOSIS — F41.1 GAD (GENERALIZED ANXIETY DISORDER): ICD-10-CM

## 2025-08-26 RX ORDER — ALPRAZOLAM 0.25 MG
0.25 TABLET ORAL
Qty: 15 TABLET | Refills: 0 | Status: SHIPPED | OUTPATIENT
Start: 2025-08-26

## (undated) DEVICE — SOLUTION IRR BTL 250CC NACL

## (undated) DEVICE — DRAPE,UNDRBUT,WHT GRAD PCH,CAPPORT,20/CS: Brand: MEDLINE

## (undated) DEVICE — PAD,ABDOMINAL,8"X7.5",STERILE,LF,1/PK: Brand: MEDLINE

## (undated) DEVICE — SUT CHROMIC GUT 2-0 SH G123H

## (undated) DEVICE — SOL H2O 3000ML IRRIG

## (undated) DEVICE — DRAPE SRG 131X112X63IN GYN URO

## (undated) DEVICE — MATERNITY KNIT PANTS,SEAMLESS: Brand: WINGS

## (undated) DEVICE — SOL  .9 1000ML BTL

## (undated) DEVICE — UROLOGY DRAIN BAG

## (undated) DEVICE — ENCORE® LATEX ACCLAIM SIZE 7.5, STERILE LATEX POWDER-FREE SURGICAL GLOVE: Brand: ENCORE

## (undated) DEVICE — MINOR GENERAL: Brand: MEDLINE INDUSTRIES, INC.

## (undated) DEVICE — PLASTC TOOMEY SYRNG DISP

## (undated) DEVICE — CATH URET CONE TIP 8FR 138008

## (undated) DEVICE — SOL NACL IRRIG 0.9% 1000ML BTL

## (undated) DEVICE — TIGERTAIL 6F FLXTIP 70CM

## (undated) DEVICE — GOWN SURG AERO BLUE PERF XLG

## (undated) DEVICE — PENCIL TELESCOPE MEGADYNE SE

## (undated) DEVICE — ISOVUE 300 10X100ML VIAL

## (undated) DEVICE — CYSTO PACK: Brand: MEDLINE INDUSTRIES, INC.

## (undated) DEVICE — STERILE SURGICAL LUBRICANT, METAL TUBE: Brand: SURGILUBE

## (undated) DEVICE — SUT CHROMIC GUT 2-0 UR-6 N878H

## (undated) DEVICE — CONTAINER CLEAN 8OZ W/LID

## (undated) DEVICE — MEDI-VAC NON-CONDUCTIVE SUCTION TUBING: Brand: CARDINAL HEALTH

## (undated) DEVICE — PAD ALC 43.5X24IN PREP  LF

## (undated) DEVICE — SKIN PREP TRAY 4 COMPARTM TRAY: Brand: MEDLINE INDUSTRIES, INC.

## (undated) NOTE — Clinical Note
IUP at 35w1d  Normal fetal growth and  testing  Polyhydramnios  Incomplete GDM screening  Gestational HTN    RECOMMENDATIONS:  Continue care with Dr. Amy Reis  Weekly NST & ALFA  Delivery is advised at 37 weeks for gestational HTN without severe feat

## (undated) NOTE — LETTER
VACCINE ADMINISTRATION RECORD  PARENT / GUARDIAN APPROVAL  Date: 3/29/2021  Vaccine administered to:  Paresh Seymour     : 1990    MRN: [id number]    A copy of the appropriate Centers for Disease Control and Prevention Vaccine Information statem

## (undated) NOTE — LETTER
February 23, 2017    Patient: Leodan Arredondo   Date of Visit: 2/23/2017       To Whom It May Concern:    Jas Vogt was seen and treated in our emergency department on 2/23/2017.    If you have any questions or concerns, please don't hesitate

## (undated) NOTE — Clinical Note
I saw Alexandr Darling in the DEPARTMENT OF [facility] today for Viral uri with cough  (primary encounter diagnosis). she was treated with bromfed dm. Alexandr Darling will follow up with you if no better or as needed.  Thank you for the opportunity to care for [facility]

## (undated) NOTE — ED AVS SNAPSHOT
Jerica Argueta   MRN: [id number]    Department:  [facility] Emergency Department   Date of Visit:  4/22/2019           Disclosure     Insurance plans vary and the physician(s) referred by the ER may not be covered by your plan.  Please contact CARE PHYSICIAN AT ONCE OR RETURN IMMEDIATELY TO THE EMERGENCY DEPARTMENT. If you have been prescribed any medication(s), please fill your prescription right away and begin taking the medication(s) as directed.   If you believe that any of the medications

## (undated) NOTE — LETTER
7/11/2017              [address]         Dear Alexandr Darling,    This letter is to inform you that your [address] test were normal. There is no need for further testing at this time.  I look forward

## (undated) NOTE — LETTER
Date & Time: 4/21/2021, 10:19 AM  Patient: Andi Mariano  Encounter Provider(s):    EUSEBIO Gutiérrez       To Whom It May Concern:    Mookie García was seen and treated in our department on 4/21/2021.  She should not return to work until 04/23

## (undated) NOTE — LETTER
No referring provider defined for this encounter.        05/06/19        Patient: Mark Henderson   YOB: 1990   Date of Visit: 5/6/2019       Dear  Dr. John Paul Espinosa MD,      Thank you for referring Mark Henderson to my practi reasons for, nature of, alternatives to the above treatment and I answered questions on treatment; patient understands all of this and wants to proceed with proposed workup and procedures listed above.  She does want the cystoscopy, possible biopsy under MA renal colic or flank pain. Patient feels this is stable and decides to observe.     (Z87.891) Former smoker  I explained to the patient that history of smoking increases risk for bladder cancer by 3x. RECOMMENDATIONS AND TREATMENT PLAN      1.    Lobo

## (undated) NOTE — LETTER
[facility]  Administration of Anesthesia  1. I, [address] 89, or _________________________________ acting on her behalf, (Patient) (Dependent/Representative) request to receive anesthesia for my pending procedure/operation/treatment. bleeding, seizure, cardiac arrest and death. 7. AWARENESS: I understand that it is possible (but unlikely) to have explicit memory of events from the operating room while under general anesthesia.   8. ELECTROCONVULSIVE THERAPY PATIENTS: This consent serve below affirms that prior to the time of the procedure, I have explained to the patient and/or his/her guardian, the risks and benefits of undergoing anesthesia, as well as any reasonable alternatives.     ___________________________________________________

## (undated) NOTE — LETTER
Date & Time: 1/6/2024, 10:08 AM  Patient: Alena Kevin  Encounter Provider(s):    Vandana Johnson PA       To Whom It May Concern:    Alena Kevin was seen and treated in our department on 1/6/2024. She may return to work on 1/6/2024.    If you have any questions or concerns, please do not hesitate to call.        _____________________________  Physician/APC Signature

## (undated) NOTE — LETTER
Date: 8/21/2018    Patient Name: Maurice Valladares          To Whom it may concern: This letter has been written at the patient's request. The above patient was seen at the [facility] for treatment of a medical condition.     This patient sh

## (undated) NOTE — LETTER
2/15/2021          To Whom It May Concern:    Damien Mcmullen is currently under my medical care. Please excuse Alexandr Darling from work today 2/15/2021 due to medical concerns. If you require additional information please contact our office.         Since

## (undated) NOTE — LETTER
7/12/2017              [address]         Dear Ely Bejarano,    It was a pleasure to see you. Your PAP test was normal.  There is no need for further testing at this time.   I look forward to seeing you

## (undated) NOTE — ED AVS SNAPSHOT
[facility] Emergency Department    [address]     [place] [address]    Phone:  [phone]    Fax:  [address]   MRN: [id number]    Department:  [facility] Emergency Department   Date of Visit:  2 coverage and benefits available for follow-up care and referrals. If you have difficulty scheduling your follow-up appointment as directed, please call our  at [phone].      Si tiene problemas para programar kings mahesh de seguimiento s different from what your Primary Care doctor has instructed you - please continue to take your medications as instructed by your Primary Care doctor until you can check with your doctor. Please bring the medication list to your next doctor's appointment. can help with your Affordable Care Act coverage, as well as all types of Medicaid plans. To get signed up and covered, please call [phone] and ask to get set up for an insurance coverage that is in-network with [facility]

## (undated) NOTE — LETTER
Date & Time: 11/25/2023, 4:18 PM  Patient: Siddhartha White  Encounter Provider(s):    Dashawn Puente       To Whom It May Concern:    Ilda Chiang was seen and treated in our department on 11/25/2023. She should not return to work until [employer]  . If you have any questions or concerns, please do not hesitate to call.     ROSEMARIE CARTER     _____________________________  [id number]/OZL Signature

## (undated) NOTE — ED AVS SNAPSHOT
[facility] Emergency Department    [address]     [place] [address]    Phone:  [phone]    Fax:  [phone]   MRN: [id number]    Department:  [facility] Emergency Department   Date of Visit:  2 and Class Registration line at [phone] or find a doctor online by visiting [email or web address].    IF THERE IS ANY CHANGE OR WORSENING OF YOUR CONDITION, CALL YOUR PRIMARY CARE PHYSICIAN AT ONCE OR RETURN IMMEDIATELY TO [address] [place] [address].     If

## (undated) NOTE — LETTER
10/22/2020          To Whom It May Concern:    Ashanti Paz is currently under my medical care. Please excuse Jasmin Nuno for missing work on 10/21/20 as she was unable to go in on this date.    If you require additional information please contact our of